# Patient Record
Sex: FEMALE | Race: WHITE | NOT HISPANIC OR LATINO | Employment: OTHER | URBAN - METROPOLITAN AREA
[De-identification: names, ages, dates, MRNs, and addresses within clinical notes are randomized per-mention and may not be internally consistent; named-entity substitution may affect disease eponyms.]

---

## 2022-10-28 ENCOUNTER — OFFICE VISIT (OUTPATIENT)
Dept: ORTHOPEDICS | Facility: CLINIC | Age: 42
End: 2022-10-28
Payer: COMMERCIAL

## 2022-10-28 ENCOUNTER — HOSPITAL ENCOUNTER (OUTPATIENT)
Dept: RADIOLOGY | Facility: HOSPITAL | Age: 42
Discharge: HOME | End: 2022-10-28
Attending: STUDENT IN AN ORGANIZED HEALTH CARE EDUCATION/TRAINING PROGRAM
Payer: COMMERCIAL

## 2022-10-28 ENCOUNTER — TELEPHONE (OUTPATIENT)
Dept: ORTHOPEDICS | Facility: CLINIC | Age: 42
End: 2022-10-28
Payer: COMMERCIAL

## 2022-10-28 DIAGNOSIS — M25.531 RIGHT WRIST PAIN: ICD-10-CM

## 2022-10-28 DIAGNOSIS — M65.4 DE QUERVAIN'S TENOSYNOVITIS, RIGHT: Primary | ICD-10-CM

## 2022-10-28 DIAGNOSIS — M25.531 RIGHT WRIST PAIN: Primary | ICD-10-CM

## 2022-10-28 PROCEDURE — 1159F MED LIST DOCD IN RCRD: CPT | Mod: CPTII,S$GLB,, | Performed by: STUDENT IN AN ORGANIZED HEALTH CARE EDUCATION/TRAINING PROGRAM

## 2022-10-28 PROCEDURE — 1159F PR MEDICATION LIST DOCUMENTED IN MEDICAL RECORD: ICD-10-PCS | Mod: CPTII,S$GLB,, | Performed by: STUDENT IN AN ORGANIZED HEALTH CARE EDUCATION/TRAINING PROGRAM

## 2022-10-28 PROCEDURE — 99999 PR PBB SHADOW E&M-EST. PATIENT-LVL III: CPT | Mod: PBBFAC,,, | Performed by: STUDENT IN AN ORGANIZED HEALTH CARE EDUCATION/TRAINING PROGRAM

## 2022-10-28 PROCEDURE — 99999 PR PBB SHADOW E&M-EST. PATIENT-LVL III: ICD-10-PCS | Mod: PBBFAC,,, | Performed by: STUDENT IN AN ORGANIZED HEALTH CARE EDUCATION/TRAINING PROGRAM

## 2022-10-28 PROCEDURE — 20550 TENDON SHEATH: ICD-10-PCS | Mod: RT,S$GLB,, | Performed by: STUDENT IN AN ORGANIZED HEALTH CARE EDUCATION/TRAINING PROGRAM

## 2022-10-28 PROCEDURE — 73110 X-RAY EXAM OF WRIST: CPT | Mod: 26,RT,, | Performed by: RADIOLOGY

## 2022-10-28 PROCEDURE — 73110 X-RAY EXAM OF WRIST: CPT | Mod: TC,FY,PO,RT

## 2022-10-28 PROCEDURE — 1160F RVW MEDS BY RX/DR IN RCRD: CPT | Mod: CPTII,S$GLB,, | Performed by: STUDENT IN AN ORGANIZED HEALTH CARE EDUCATION/TRAINING PROGRAM

## 2022-10-28 PROCEDURE — 99203 PR OFFICE/OUTPT VISIT, NEW, LEVL III, 30-44 MIN: ICD-10-PCS | Mod: 25,S$GLB,, | Performed by: STUDENT IN AN ORGANIZED HEALTH CARE EDUCATION/TRAINING PROGRAM

## 2022-10-28 PROCEDURE — 1160F PR REVIEW ALL MEDS BY PRESCRIBER/CLIN PHARMACIST DOCUMENTED: ICD-10-PCS | Mod: CPTII,S$GLB,, | Performed by: STUDENT IN AN ORGANIZED HEALTH CARE EDUCATION/TRAINING PROGRAM

## 2022-10-28 PROCEDURE — 76942 TENDON SHEATH: ICD-10-PCS | Mod: S$GLB,,, | Performed by: STUDENT IN AN ORGANIZED HEALTH CARE EDUCATION/TRAINING PROGRAM

## 2022-10-28 PROCEDURE — 99203 OFFICE O/P NEW LOW 30 MIN: CPT | Mod: 25,S$GLB,, | Performed by: STUDENT IN AN ORGANIZED HEALTH CARE EDUCATION/TRAINING PROGRAM

## 2022-10-28 PROCEDURE — 73110 XR WRIST COMPLETE 3 VIEWS RIGHT: ICD-10-PCS | Mod: 26,RT,, | Performed by: RADIOLOGY

## 2022-10-28 PROCEDURE — 20550 NJX 1 TENDON SHEATH/LIGAMENT: CPT | Mod: RT,S$GLB,, | Performed by: STUDENT IN AN ORGANIZED HEALTH CARE EDUCATION/TRAINING PROGRAM

## 2022-10-28 PROCEDURE — 4010F PR ACE/ARB THEARPY RXD/TAKEN: ICD-10-PCS | Mod: CPTII,S$GLB,, | Performed by: STUDENT IN AN ORGANIZED HEALTH CARE EDUCATION/TRAINING PROGRAM

## 2022-10-28 PROCEDURE — 4010F ACE/ARB THERAPY RXD/TAKEN: CPT | Mod: CPTII,S$GLB,, | Performed by: STUDENT IN AN ORGANIZED HEALTH CARE EDUCATION/TRAINING PROGRAM

## 2022-10-28 PROCEDURE — 76942 ECHO GUIDE FOR BIOPSY: CPT | Mod: S$GLB,,, | Performed by: STUDENT IN AN ORGANIZED HEALTH CARE EDUCATION/TRAINING PROGRAM

## 2022-10-28 RX ORDER — METFORMIN HYDROCHLORIDE 500 MG/1
TABLET, EXTENDED RELEASE ORAL
COMMUNITY
Start: 2022-10-14

## 2022-10-28 RX ORDER — PANTOPRAZOLE SODIUM 40 MG/1
TABLET, DELAYED RELEASE ORAL
COMMUNITY
Start: 2022-08-11

## 2022-10-28 RX ORDER — MELOXICAM 15 MG
TABLET ORAL
COMMUNITY
Start: 2022-05-19 | End: 2023-05-15

## 2022-10-28 RX ORDER — CYCLOBENZAPRINE HCL 10 MG
10 TABLET ORAL 3 TIMES DAILY PRN
COMMUNITY
Start: 2022-10-22 | End: 2023-05-15

## 2022-10-28 RX ORDER — PREDNISONE 20 MG/1
TABLET ORAL
COMMUNITY
Start: 2022-10-22 | End: 2022-12-07

## 2022-10-28 RX ORDER — BETAMETHASONE SODIUM PHOSPHATE AND BETAMETHASONE ACETATE 3; 3 MG/ML; MG/ML
6 INJECTION, SUSPENSION INTRA-ARTICULAR; INTRALESIONAL; INTRAMUSCULAR; SOFT TISSUE
Status: DISCONTINUED | OUTPATIENT
Start: 2022-10-28 | End: 2022-10-28 | Stop reason: HOSPADM

## 2022-10-28 RX ADMIN — BETAMETHASONE SODIUM PHOSPHATE AND BETAMETHASONE ACETATE 6 MG: 3; 3 INJECTION, SUSPENSION INTRA-ARTICULAR; INTRALESIONAL; INTRAMUSCULAR; SOFT TISSUE at 11:10

## 2022-10-28 NOTE — PATIENT INSTRUCTIONS
Assessment:  Maria Guadalupe Louise is a 42 y.o. female with a chief complaint of Pain of the Right Wrist      Encounter Diagnosis   Name Primary?    De Quervain's tenosynovitis, right Yes        Plan:  XR reviewed today, nondiagnostic  History & clinical exam c/w dequervain's tenosynovitis  Has not responded with NSAIDs & bracing thus far  Recommend CSI today  Continue brace for the next week, then wean as able  Can consider repeat CSI if persistent or recurs    Follow-up: 6 weeks or sooner if there are any problems between now and then.    Thank you for choosing [facility] and Dr. Saúl Jarquin for your orthopedic & sports medicine care. It is our goal to provide you with exceptional care that will help keep you healthy, active, and get you back in the game.    Please do not hesitate to reach out to us via email, phone, or MyChart with any questions, concerns, or feedback.    If you are experiencing pain/discomfort ,or have questions after 5pm and would like to be connected to the [facility] on-call team, please call this number and specify which Sports Medicine provider is treating you: [phone]

## 2022-10-28 NOTE — TELEPHONE ENCOUNTER
Spoke with pt to inform her to arrive 30 min prior to appt for xrays. Pt explained she is having palm pain with some in wrist. Pt suspects CTS. Pt verbalized understanding of appt date, time, and location.

## 2022-10-28 NOTE — PROGRESS NOTES
Patient ID: Maria Guadalupe Louise  YOB: 1980  MRN: [id number]    Chief Complaint: Pain of the Right Wrist    Referred By: Self    History of Present Illness: Maria Guadalupe Louise is a right-hand dominant 42 y.o. female who presents today with right wrist pain.     She complains of right wrist pain that has worsened in severity over the past few weeks without injury.  Pain is located in the radial side of the wrist.  Worsened with any type of movement.  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  She has tried using OTC NSAIDs and a wrist brace without relief.  No injections or physical therapy.  No parasthesias.    Past Medical History:   History reviewed. No pertinent past medical history.  Past Surgical History:   Procedure Laterality Date    ABDOMINOPLASTY      AUGMENTATION OF BREAST Bilateral 2011    DILATION AND CURETTAGE OF UTERUS      excision of vulvar mass      HYSTERECTOMY      HYSTEROSCOPY      LIPOSUCTION      MASTOPEXY Bilateral     novasure      oral sx      TUBAL LIGATION      TYMPANOSTOMY TUBE PLACEMENT      WISDOM TOOTH EXTRACTION       Family History   Problem Relation Age of Onset    Ovarian cancer Maternal Grandfather     Breast cancer Maternal Grandfather      Social History     Socioeconomic History    Marital status:    Tobacco Use    Smoking status: Never    Smokeless tobacco: Never   Substance and Sexual Activity    Alcohol use: Not Currently    Drug use: Never     Medication List with Changes/Refills   Current Medications    ATENOLOL (TENORMIN) 25 MG TABLET        CYCLOBENZAPRINE (FLEXERIL) 10 MG TABLET    Take 10 mg by mouth 3 (three) times daily as needed.    LISINOPRIL (PRINIVIL,ZESTRIL) 20 MG TABLET        METFORMIN (GLUCOPHAGE-XR) 500 MG ER 24HR TABLET        MOBIC 15 MG TABLET    Take 1 tablet by mouth once a day as needed with food    PANTOPRAZOLE (PROTONIX) 40 MG TABLET        PREDNISONE (DELTASONE) 20 MG TABLET    prednisone Take  2 Tablet (oral) 1 time per day for 7 days 20221022 tablet 1 time per day oral 7 days active 20 mg     Review of patient's allergies indicates:   Allergen Reactions    Penicillins Anaphylaxis and Swelling    Hydromorphone     Macrolide antibiotics Nausea And Vomiting       Physical Exam:   There is no height or weight on file to calculate BMI.    Physical Exam  Constitutional:       General: She is not in acute distress.     Appearance: Normal appearance.   HENT:      Head: Normocephalic and atraumatic.   Eyes:      Extraocular Movements: Extraocular movements intact.      Conjunctiva/sclera: Conjunctivae normal.   Pulmonary:      Effort: Pulmonary effort is normal. No respiratory distress.   Skin:     General: Skin is warm and dry.   Neurological:      General: No focal deficit present.      Mental Status: She is alert and oriented to person, place, and time.   Psychiatric:         Mood and Affect: Mood normal.         Detailed MSK exam:   Right Wrist:  Inspection:    No effusion, erythema, or ecchymosis   Palpation Tenderness: Severe tenderness Radial styloid, 1st dorsal compartment  Range of motion:   Full hand/wrist ROM equal bilaterally   Strength:    Decreased  strength  Tests:   Negative Tinel's at wrist     Positive Finklestein's test  N/V Exam:  Radial: Normal motor (EPL/thumbs up)              Normal sensory (dorsal hand)   Median: Normal motor (FPL/A-OK)      Normal sensory (thumb)   Ulnar:  Normal motor (Interossei/scissors-spread)     Normal sensory (5th finger)   LABC: Normal sensory (lateral forearm)   MABC: Normal sensory (medial forearm)   MC: Normal motor (elbow flexion)   Axillary: Normal motor/sensory (deltoid)  Normal radial and ulnar pulses, warm and well perfused with capillary refill < 2 sec         Imaging:  X-Ray Wrist Complete Right  Narrative: EXAMINATION:  Five images right wrist    CLINICAL HISTORY:  Pain    COMPARISON:  None    FINDINGS:  Radiocarpal joint space narrowing is  present.  No fracture, dislocation or aggressive osseous process.  Soft tissues are normal.  Impression: Mild degenerative changes    Electronically signed by: Frantz Gutierrez MD  Date:    10/28/2022  Time:    11:09      Relevant imaging results were reviewed and interpreted by me and per my read: Normal appearing radiographs of the right wrist, without any significant degenerative changes, and no fractures or other acute abnormalities.    This was discussed with the patient and / or family today.       Patient Instructions   Assessment:  Maria Guadalupe Louise is a 42 y.o. female with a chief complaint of Pain of the Right Wrist      Encounter Diagnosis   Name Primary?    De Quervain's tenosynovitis, right Yes        Plan:  XR reviewed today, nondiagnostic  History & clinical exam c/w dequervain's tenosynovitis  Has not responded with NSAIDs & bracing thus far  Recommend CSI today  Continue brace for the next week, then wean as able  Can consider repeat CSI if persistent or recurs    Follow-up: 6 weeks or sooner if there are any problems between now and then.    Thank you for choosing [facility] and Dr. Saúl Jarquin for your orthopedic & sports medicine care. It is our goal to provide you with exceptional care that will help keep you healthy, active, and get you back in the game.    Please do not hesitate to reach out to us via email, phone, or MyChart with any questions, concerns, or feedback.    If you are experiencing pain/discomfort ,or have questions after 5pm and would like to be connected to the [facility] on-call team, please call this number and specify which Sports Medicine provider is treating you: [phone]         A copy of today's visit note has been sent to the referring provider.       Saúl Jarquin MD  Primary Care Sports Medicine        Disclaimer: This note was prepared using a voice recognition system and is likely to have sound alike  errors within the text.

## 2022-10-28 NOTE — PROCEDURES
Tendon Sheath    Date/Time: 10/28/2022 11:30 AM  Performed by: Saúl Jarquin MD  Authorized by: Saúl Jarquin MD     Consent Done?:  Yes (Verbal)  Indications:  Pain, joint swelling and diagnostic evaluation  Site marked: the procedure site was marked    Timeout: prior to procedure the correct patient, procedure, and site was verified    Local anesthesia used?: Yes    Anesthesia:  Local infiltration  Local anesthetic:  Lidocaine 1% without epinephrine and topical anesthetic  Anesthetic total (ml):  1    Location:  Wrist  Site:  R first doral compartment  Ultrasonic guidance for needle placement?: Yes    Needle size:  25 G  Approach:  Medial  Medications:  6 mg betamethasone acetate-betamethasone sodium phosphate 6 mg/mL  Patient tolerance:  Patient tolerated the procedure well with no immediate complications    Additional Comments: Ultrasound guidance was used for needle localization. Images were saved and stored for documentation. The appropriate structures were visualized. Dynamic visualization of the needle was continuous throughout the procedures and maintained good position.     We discussed the proper protocols after the injection such as no submerging pools, baths tubs, or hot tubs for 48 hr.  Showering is okay today.  We also discussed that blood sugars can be elevated after an injection and asked patient to properly check their sugars over the next few days and contact their PCP if there are any concerns.  We discussed red flags such as fevers, chills, red, warm, tender joint at the area of injection to please seek medical care immediately.

## 2022-11-01 ENCOUNTER — TELEPHONE (OUTPATIENT)
Dept: ORTHOPEDICS | Facility: CLINIC | Age: 42
End: 2022-11-01
Payer: COMMERCIAL

## 2022-11-01 NOTE — TELEPHONE ENCOUNTER
Spoke with pt to discuss next steps for wrist pain. Pt states no relief from injection Friday. Explained it could take up to one week to feel relief from injection. Per Dr. Jarquin, offered her appt for next week and ibuprofen 800 as an inflammatory. Pt was disappointed in options currently. Offered her appt next week. Explained she could discuss options with Dr. Jarquin including MRI, second injection. Pt stated she would schedule on her own.    ----- Message from Marlena Koenig sent at 11/1/2022  8:37 AM CDT -----  States she got a shot in her wrist on Friday. States it hasn't helped at all. Please call pt [phone]. Thank you

## 2022-11-09 ENCOUNTER — TELEPHONE (OUTPATIENT)
Dept: ORTHOPEDICS | Facility: CLINIC | Age: 42
End: 2022-11-09
Payer: COMMERCIAL

## 2022-11-09 NOTE — TELEPHONE ENCOUNTER
Called regarding upcoming appt. 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 Explained that it is best to continue care w/ the provider she is established w/. Pt insisted on being seen within two days due to no relief w/ CSI. Scheduled soonest available w/ upper extremity provider. Verified appt time, date, and location w/ the pt.

## 2022-11-22 ENCOUNTER — HOSPITAL ENCOUNTER (OUTPATIENT)
Dept: RADIOLOGY | Facility: HOSPITAL | Age: 42
Discharge: HOME | End: 2022-11-22
Attending: INTERNAL MEDICINE
Payer: COMMERCIAL

## 2022-11-22 ENCOUNTER — OFFICE VISIT (OUTPATIENT)
Dept: RHEUMATOLOGY | Facility: CLINIC | Age: 42
End: 2022-11-22
Payer: COMMERCIAL

## 2022-11-22 VITALS
BODY MASS INDEX: 25.79 KG/M2 | WEIGHT: 160.5 LBS | HEART RATE: 89 BPM | DIASTOLIC BLOOD PRESSURE: 83 MMHG | HEIGHT: 66 IN | SYSTOLIC BLOOD PRESSURE: 134 MMHG

## 2022-11-22 DIAGNOSIS — M15.9 PRIMARY OSTEOARTHRITIS INVOLVING MULTIPLE JOINTS: ICD-10-CM

## 2022-11-22 DIAGNOSIS — M19.90 INFLAMMATORY ARTHRITIS: ICD-10-CM

## 2022-11-22 DIAGNOSIS — M79.641 PAIN IN BOTH HANDS: ICD-10-CM

## 2022-11-22 DIAGNOSIS — M79.642 PAIN IN BOTH HANDS: ICD-10-CM

## 2022-11-22 DIAGNOSIS — Q76.49 BERTOLOTTI'S SYNDROME: ICD-10-CM

## 2022-11-22 DIAGNOSIS — M13.80 SERONEGATIVE ARTHRITIS: Primary | ICD-10-CM

## 2022-11-22 PROCEDURE — 3079F DIAST BP 80-89 MM HG: CPT | Mod: CPTII,S$GLB,, | Performed by: INTERNAL MEDICINE

## 2022-11-22 PROCEDURE — 3075F PR MOST RECENT SYSTOLIC BLOOD PRESS GE 130-139MM HG: ICD-10-PCS | Mod: CPTII,S$GLB,, | Performed by: INTERNAL MEDICINE

## 2022-11-22 PROCEDURE — 1159F MED LIST DOCD IN RCRD: CPT | Mod: CPTII,S$GLB,, | Performed by: INTERNAL MEDICINE

## 2022-11-22 PROCEDURE — 1159F PR MEDICATION LIST DOCUMENTED IN MEDICAL RECORD: ICD-10-PCS | Mod: CPTII,S$GLB,, | Performed by: INTERNAL MEDICINE

## 2022-11-22 PROCEDURE — 4010F PR ACE/ARB THEARPY RXD/TAKEN: ICD-10-PCS | Mod: CPTII,S$GLB,, | Performed by: INTERNAL MEDICINE

## 2022-11-22 PROCEDURE — 77077 JOINT SURVEY SINGLE VIEW: CPT | Mod: TC,PO

## 2022-11-22 PROCEDURE — 3075F SYST BP GE 130 - 139MM HG: CPT | Mod: CPTII,S$GLB,, | Performed by: INTERNAL MEDICINE

## 2022-11-22 PROCEDURE — 99204 PR OFFICE/OUTPT VISIT, NEW, LEVL IV, 45-59 MIN: ICD-10-PCS | Mod: S$GLB,,, | Performed by: INTERNAL MEDICINE

## 2022-11-22 PROCEDURE — 3008F BODY MASS INDEX DOCD: CPT | Mod: CPTII,S$GLB,, | Performed by: INTERNAL MEDICINE

## 2022-11-22 PROCEDURE — 77077 JOINT SURVEY SINGLE VIEW: CPT | Mod: 26,,, | Performed by: RADIOLOGY

## 2022-11-22 PROCEDURE — 3079F PR MOST RECENT DIASTOLIC BLOOD PRESSURE 80-89 MM HG: ICD-10-PCS | Mod: CPTII,S$GLB,, | Performed by: INTERNAL MEDICINE

## 2022-11-22 PROCEDURE — 99999 PR PBB SHADOW E&M-EST. PATIENT-LVL IV: ICD-10-PCS | Mod: PBBFAC,,, | Performed by: INTERNAL MEDICINE

## 2022-11-22 PROCEDURE — 99999 PR PBB SHADOW E&M-EST. PATIENT-LVL IV: CPT | Mod: PBBFAC,,, | Performed by: INTERNAL MEDICINE

## 2022-11-22 PROCEDURE — 3008F PR BODY MASS INDEX (BMI) DOCUMENTED: ICD-10-PCS | Mod: CPTII,S$GLB,, | Performed by: INTERNAL MEDICINE

## 2022-11-22 PROCEDURE — 77077 XR ARTHRITIS SURVEY: ICD-10-PCS | Mod: 26,,, | Performed by: RADIOLOGY

## 2022-11-22 PROCEDURE — 4010F ACE/ARB THERAPY RXD/TAKEN: CPT | Mod: CPTII,S$GLB,, | Performed by: INTERNAL MEDICINE

## 2022-11-22 PROCEDURE — 99204 OFFICE O/P NEW MOD 45 MIN: CPT | Mod: S$GLB,,, | Performed by: INTERNAL MEDICINE

## 2022-11-22 RX ORDER — TOPIRAMATE 50 MG/1
50 TABLET, FILM COATED ORAL DAILY
COMMUNITY
Start: 2022-10-28

## 2022-11-22 RX ORDER — COLCHICINE 0.6 MG/1
TABLET ORAL
COMMUNITY
Start: 2022-11-17 | End: 2023-03-28

## 2022-11-22 RX ORDER — GABAPENTIN 300 MG/1
300 CAPSULE ORAL DAILY
COMMUNITY
Start: 2022-11-09 | End: 2023-05-15

## 2022-11-22 RX ORDER — ALLOPURINOL 100 MG/1
100 TABLET ORAL DAILY
COMMUNITY
Start: 2022-11-10 | End: 2023-03-28

## 2022-11-22 RX ORDER — IBUPROFEN 800 MG/1
TABLET ORAL
COMMUNITY
End: 2023-05-15

## 2022-11-22 RX ORDER — METHYLPREDNISOLONE 4 MG/1
TABLET ORAL
Qty: 21 EACH | Refills: 0 | Status: SHIPPED | OUTPATIENT
Start: 2022-11-22 | End: 2022-12-07

## 2022-11-22 RX ORDER — CELECOXIB 100 MG/1
100 CAPSULE ORAL 2 TIMES DAILY
COMMUNITY
Start: 2022-11-09

## 2022-11-22 NOTE — PROGRESS NOTES
RHEUMATOLOGY OUTPATIENT CLINIC NOTE    11/22/2022    Attending Rheumatologist: Braxton Contreras  Primary Care Provider/Physician Requesting Consultation: Primary Doctor No   Chief Complaint/Reason For Consultation:  Gout      Subjective:     Maria Guadalupe Louise is a 42 y.o. White female with inflammatory arthritis for evaluation.    Main concern of recurrent joint pain with inflammatory characteristics.    Addendum: MRI 11/29 reviewed. Ulnar side tear of TFCC with associated effusion.  Widespread extensor tendon tenosynovitis. Tophi, erosive changes, or bone marrow edema not present.  Cannot completely exclude seronegative arthritis as DDX.  Suggest to continue DMARD trial.    Addendum 1/2023: refractory pain, no efficacy from methotrexate as DMARD therapy.  Suggest trial of TNFi for at least 3 months once Quant TB resulted negative.  Written literature on biologic provided.    Review of Systems   Constitutional:  Negative for fever.   HENT:          Denies sicca sx   Eyes:  Negative for photophobia and pain.        Denies uveitis   Respiratory:  Negative for shortness of breath.    Gastrointestinal:         No hx of IBD. Hx of stress ulcers.  No history of diverticulosis   Genitourinary:  Negative for hematuria.        History of nephrolithiasis   Musculoskeletal:  Positive for joint pain.        No FHx of rheumatic disease   Skin:  Negative for rash.        No history of PsO. Denies RP   Endo/Heme/Allergies:         No history of vascular thrombosis     Chronic comorbid conditions affecting medical decision making today:  No past medical history on file.  Past Surgical History:   Procedure Laterality Date    ABDOMINOPLASTY      AUGMENTATION OF BREAST Bilateral 2011    DILATION AND CURETTAGE OF UTERUS      excision of vulvar mass      HYSTERECTOMY      HYSTEROSCOPY      LIPOSUCTION      MASTOPEXY Bilateral     novasure      oral sx      TUBAL LIGATION      TYMPANOSTOMY TUBE PLACEMENT      WISDOM TOOTH EXTRACTION        Family History   Problem Relation Age of Onset    Ovarian cancer Maternal Grandfather     Breast cancer Maternal Grandfather      Social History     Tobacco Use   Smoking Status Never   Smokeless Tobacco Never       Current Outpatient Medications:     allopurinoL (ZYLOPRIM) 100 MG tablet, Take 100 mg by mouth once daily., Disp: , Rfl:     atenoloL (TENORMIN) 25 MG tablet, , Disp: , Rfl:     celecoxib (CELEBREX) 100 MG capsule, Take 100 mg by mouth 2 (two) times daily., Disp: , Rfl:     colchicine (COLCRYS) 0.6 mg tablet, Take by mouth., Disp: , Rfl:     cyclobenzaprine (FLEXERIL) 10 MG tablet, Take 10 mg by mouth 3 (three) times daily as needed., Disp: , Rfl:     gabapentin (NEURONTIN) 300 MG capsule, Take 300 mg by mouth once daily., Disp: , Rfl:     ibuprofen (ADVIL,MOTRIN) 800 MG tablet, ibuprofen 800 mg tablet  Take 1 tablet 3 times a day by oral route., Disp: , Rfl:     lisinopriL (PRINIVIL,ZESTRIL) 20 MG tablet, , Disp: , Rfl:     metFORMIN (GLUCOPHAGE-XR) 500 MG ER 24hr tablet, , Disp: , Rfl:     MOBIC 15 mg tablet, Take 1 tablet by mouth once a day as needed with food, Disp: , Rfl:     pantoprazole (PROTONIX) 40 MG tablet, , Disp: , Rfl:     predniSONE (DELTASONE) 20 MG tablet, prednisone Take 2 Tablet (oral) 1 time per day for 7 days 20221022 tablet 1 time per day oral 7 days active 20 mg, Disp: , Rfl:     topiramate (TOPAMAX) 50 MG tablet, Take 50 mg by mouth once daily., Disp: , Rfl:      Objective:     Vitals:    11/22/22 1034   BP: 134/83   Pulse: 89     Physical Exam   Eyes: Conjunctivae are normal.   Pulmonary/Chest: Effort normal. No respiratory distress.   Musculoskeletal:         General: Swelling and tenderness present. Normal range of motion.   Neurological: She displays no weakness.   Skin: No rash noted.     Reviewed available old and all outside pertinent medical records available.    All lab results personally reviewed and interpreted by me.       ASSESSMENT:     Inflammatory  arthritis    Osteoarthritis    Degenerative disk disease    Bertolotti syndrome    PLAN:     Subacute widespread inflammatory arthritis of unknown etiology.  Presumptive diagnosis of gout by PMD based on podagra.  No past history of gout or arthrocentesis.  Denies classic gout precipitants. History of nephrolithiasis. No history of PsO or IBD.  No pertinent FHx.  Active synovitis on several joints.  Suspicious rashes not identified.  Uric acid 4.7, measured during acute event.  MRI C/L spine with degenerative changes and sacralization of L5.  No ankylosis or marginal erosive changes.  Recommend systemic steroid taper to discontinue for symptomatic relief.  On allopurinol and colchicine per other providers, continue unchanged for time being.  Rheumatic workup to determine etiology.  Considering DMARD therapy with MTX after lab review if no contraindications. XR survey to establish baseline/determine if erosive changes.      Disclaimer: This note is prepared using voice recognition software and as such is likely to have errors and has not been proof read. Please contact me for questions.       Braxton Contreras M.D.

## 2022-11-23 DIAGNOSIS — M13.80 SERONEGATIVE ARTHRITIS: Primary | ICD-10-CM

## 2022-11-23 RX ORDER — METHOTREXATE 2.5 MG/1
15 TABLET ORAL
Qty: 24 TABLET | Refills: 4 | Status: SHIPPED | OUTPATIENT
Start: 2022-11-23 | End: 2023-02-15 | Stop reason: SDUPTHER

## 2022-11-23 RX ORDER — FOLIC ACID 1 MG/1
1 TABLET ORAL DAILY
Qty: 30 TABLET | Refills: 4 | Status: SHIPPED | OUTPATIENT
Start: 2022-11-23 | End: 2023-02-15 | Stop reason: SDUPTHER

## 2022-11-23 RX ORDER — HYDROXYCHLOROQUINE SULFATE 200 MG/1
200 TABLET, FILM COATED ORAL 2 TIMES DAILY
Qty: 60 TABLET | Refills: 3 | Status: SHIPPED | OUTPATIENT
Start: 2022-11-23 | End: 2022-12-23

## 2022-11-28 ENCOUNTER — TELEPHONE (OUTPATIENT)
Dept: RHEUMATOLOGY | Facility: CLINIC | Age: 42
End: 2022-11-28
Payer: COMMERCIAL

## 2022-11-28 DIAGNOSIS — M13.80 SERONEGATIVE ARTHRITIS: Primary | ICD-10-CM

## 2022-11-28 NOTE — TELEPHONE ENCOUNTER
Spoke with lab and the  arrived too late and the TB gold was not able to get in the incubator in time. Will need to be redrawn.     Please place new orders,

## 2022-12-02 ENCOUNTER — TELEPHONE (OUTPATIENT)
Dept: ORTHOPEDICS | Facility: CLINIC | Age: 42
End: 2022-12-02
Payer: COMMERCIAL

## 2022-12-02 NOTE — TELEPHONE ENCOUNTER
Patient reported to clinic today to drop off an MRI of her wrist for Dr. Jarquin to review.  I provided my email address for her to send the report and asked her to obtain a disc so that we can evaluate the images.  She also requested a sling.  I explained that we do not typically use slings for the treatment of wrist pain and that she risks elbow stiffness if she immobilizes her entire upper extremity.  Upon further questioning, she reported that she is not using her wrist brace because it doesn't help.  I expressed that it is doubtful that a sling would be helpful if the wrist brace is not helping and recommended that we discuss further after we review her MRI at her visit next Friday.  The patient agreed with these instructions.

## 2022-12-03 ENCOUNTER — PATIENT MESSAGE (OUTPATIENT)
Dept: RHEUMATOLOGY | Facility: CLINIC | Age: 42
End: 2022-12-03
Payer: COMMERCIAL

## 2022-12-03 ENCOUNTER — PATIENT MESSAGE (OUTPATIENT)
Dept: ORTHOPEDICS | Facility: CLINIC | Age: 42
End: 2022-12-03
Payer: COMMERCIAL

## 2022-12-07 ENCOUNTER — PATIENT MESSAGE (OUTPATIENT)
Dept: RHEUMATOLOGY | Facility: CLINIC | Age: 42
End: 2022-12-07
Payer: COMMERCIAL

## 2022-12-07 RX ORDER — PREDNISONE 5 MG/1
TABLET ORAL
Qty: 90 TABLET | Refills: 1 | Status: SHIPPED | OUTPATIENT
Start: 2022-12-07 | End: 2023-03-01 | Stop reason: SDUPTHER

## 2022-12-07 RX ORDER — MULTIVITAMIN
1 TABLET ORAL 2 TIMES DAILY
Qty: 60 TABLET | Refills: 3 | Status: SHIPPED | OUTPATIENT
Start: 2022-12-07 | End: 2023-03-01 | Stop reason: SDUPTHER

## 2022-12-07 NOTE — TELEPHONE ENCOUNTER
Spoke with pt and advised her of below, explained that the MRI report shows a tear and that she would need to see Orthopedics. Pt states that Dr. Jarquin has still not even read the MRI report she dropped off and she feels like no one cares about what is going on with her and she is in excruciating pain. States she has to get her neurologist to order MRI. Advised her that she could try prednisone to allow time for the MTX to work but she may still have pain from the tear. States that Ortho told her they do not have any openings right now and she isnt sure when she could even see them and she needs something done now. Advised her that I am the supervisor for Rheum and I would reach out to the Supervisor for Ortho to see if they can assist with getting the MRI reviewed sooner than later and an appt. Pt states that she has a lot of things coming up this month and if she has to have surgery she doesn't even know when she would do it- advised her that I am not sure if it would call for surgery but she can at least talk and see what the next steps area. Stated she would try the prednisone, advised her that it will likely not give her full relief but hopefully at least a little. Pt was thankful for the call.

## 2023-01-03 ENCOUNTER — PATIENT MESSAGE (OUTPATIENT)
Dept: RHEUMATOLOGY | Facility: CLINIC | Age: 43
End: 2023-01-03
Payer: COMMERCIAL

## 2023-01-04 ENCOUNTER — SPECIALTY PHARMACY (OUTPATIENT)
Dept: PHARMACY | Facility: CLINIC | Age: 43
End: 2023-01-04
Payer: COMMERCIAL

## 2023-01-04 DIAGNOSIS — M13.80 SERONEGATIVE ARTHRITIS: Primary | ICD-10-CM

## 2023-01-04 RX ORDER — ADALIMUMAB 40MG/0.4ML
40 KIT SUBCUTANEOUS
Qty: 6 PEN | Refills: 1 | Status: SHIPPED | OUTPATIENT
Start: 2023-01-04 | End: 2023-04-25

## 2023-01-04 NOTE — TELEPHONE ENCOUNTER
Dinorah, this is Mehrdad Garber with [facility].  We are working on your prescription that your doctor has sent us. We will be working with your insurance to get this approved for you. We will be calling you along the way with updates on your medication.  If you have any questions, you can reach us at [phone].    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   -Aware that Tb test needs to be collected prior to submitting PA.

## 2023-01-05 ENCOUNTER — TELEPHONE (OUTPATIENT)
Dept: RHEUMATOLOGY | Facility: CLINIC | Age: 43
End: 2023-01-05
Payer: COMMERCIAL

## 2023-01-05 NOTE — TELEPHONE ENCOUNTER
----- Message from Jeanette Mello sent at 1/5/2023  9:17 AM CST -----  Contact: Maria Guadalupe  Patient is calling to speak with the nurse regarding treatment. Please give patient a call back at [phone]  as requested.  Thanks  LR

## 2023-01-05 NOTE — TELEPHONE ENCOUNTER
"Returned patients phone call to discuss the concerns that she has about her treatment with her. All questions answered.      Summer Godwin (Allye), [facility]  Rheumatology Department    "

## 2023-01-09 ENCOUNTER — TELEPHONE (OUTPATIENT)
Dept: RHEUMATOLOGY | Facility: CLINIC | Age: 43
End: 2023-01-09
Payer: COMMERCIAL

## 2023-01-09 NOTE — TELEPHONE ENCOUNTER
Per chart review patient missed lab appointment and labs have been rescheduled for tomorrow, 01/10/23.

## 2023-01-09 NOTE — TELEPHONE ENCOUNTER
"Returned patients phone call. All questions answered.      Summer Godwin (Allye), [facility]  Rheumatology Department    " [facility] Adderall [facility] 11[facility]10/20 for a 30 d/s. Last Visit: 10/25/19 with AHMET Velez  Next Appointment: 2/11/21 with AHMET Fagan  Previous Refill Encounter(s): 11/10/20 #90    Requested Prescriptions     Pending Prescriptions Disp Refills    dextroamphetamine-amphetamine (ADDERALL) 20 mg tablet 90 Tab 0     Sig: Take 1 Tab by mouth three (3) times daily. Max Daily Amount: 60 mg.

## 2023-01-09 NOTE — TELEPHONE ENCOUNTER
----- Message from Sandy Duffy sent at 1/9/2023  2:19 PM CST -----  Regarding: labs  Contact: patient  Patient needs to reschedule her missed labs and TB, but it would not let me reschedule, patient would like to have both labs done at [facility], please call her back at [phone]

## 2023-01-10 ENCOUNTER — LAB VISIT (OUTPATIENT)
Dept: LAB | Facility: HOSPITAL | Age: 43
End: 2023-01-10
Attending: INTERNAL MEDICINE
Payer: COMMERCIAL

## 2023-01-10 DIAGNOSIS — M19.90 INFLAMMATORY ARTHRITIS: ICD-10-CM

## 2023-01-10 DIAGNOSIS — M13.80 SERONEGATIVE ARTHRITIS: ICD-10-CM

## 2023-01-10 PROCEDURE — 86480 TB TEST CELL IMMUN MEASURE: CPT | Performed by: INTERNAL MEDICINE

## 2023-01-12 LAB
GAMMA INTERFERON BACKGROUND BLD IA-ACNC: 0.01 IU/ML
M TB IFN-G CD4+ BCKGRND COR BLD-ACNC: 0.01 IU/ML
MITOGEN IGNF BCKGRD COR BLD-ACNC: 1.15 IU/ML
TB GOLD PLUS: NEGATIVE
TB2 - NIL: -0 IU/ML

## 2023-01-13 NOTE — TELEPHONE ENCOUNTER
Humira PA for seronegative arthritis completed over the phone with DANICA MISHRA approved 1/13/2023 through 1/13/2024  Case ID: [id number]

## 2023-01-13 NOTE — TELEPHONE ENCOUNTER
Benefits Investigation -Humira    Insurance name: [employer]  Rep name: [facility]    Copay amount: $1,684.00 ($542.89 with e-voucher) ($5 with e-voucher + co-pay card)  Deductible: $100 individual ($64.38 remaining) $300 family ($264.38 remaining)  OOPmax: $1900 ($1864.38 remaining)  [facility] in network? Y

## 2023-01-13 NOTE — TELEPHONE ENCOUNTER
Outgoing call to inform patient of Humira approval and offer to obtain co-pay card on her behalf. Left voicemail.

## 2023-01-20 NOTE — TELEPHONE ENCOUNTER
Humira co-pay card obtained and processing information saved into [facility].  RxBIN: [id number]  RxPCN: [id number]  RxGrp: [id number]  RxID: [id number]  Suf: 01    Test claim yielding $5.

## 2023-01-23 ENCOUNTER — SPECIALTY PHARMACY (OUTPATIENT)
Dept: PHARMACY | Facility: CLINIC | Age: 43
End: 2023-01-23
Payer: COMMERCIAL

## 2023-01-23 DIAGNOSIS — M13.80 SERONEGATIVE ARTHRITIS: Primary | ICD-10-CM

## 2023-01-23 NOTE — TELEPHONE ENCOUNTER
Specialty Pharmacy - Initial Clinical Assessment    Specialty Medication Orders Linked to Encounter      Flowsheet Row Most Recent Value   Medication #1 adalimumab (HUMIRA,CF, PEN) 40 mg/0.4 mL PnKt (Order#[id number], Rx#[id number])          Patient Diagnosis   M13.80 - Seronegative arthritis    Subjective    Maria Guadalupe Louise is a 42 y.o. female, who is followed by the specialty pharmacy service for management and education.    Recent Encounters       Date Type Provider Description    01/23/2023 Specialty Pharmacy Mehrdad Garber PharmD Initial Clinical Assessment    01/04/2023 Specialty Pharmacy Mehrdad Garber PharmD Referral Authorization          Clinical call attempts since last clinical assessment   No call attempts found.     Current Outpatient Medications   Medication Sig Note    adalimumab (HUMIRA,CF, PEN) 40 mg/0.4 mL PnKt Inject 0.4 mLs (40 mg total) into the skin every 14 (fourteen) days.     allopurinoL (ZYLOPRIM) 100 MG tablet Take 100 mg by mouth once daily. 1/23/2023: Patient reports not taking.    atenoloL (TENORMIN) 25 MG tablet      calcium-vitamin D (CALCIUM 600 + D,3,) 600 mg-10 mcg (400 unit) Tab Take 1 tablet by mouth 2 (two) times daily.     celecoxib (CELEBREX) 100 MG capsule Take 100 mg by mouth 2 (two) times daily.     colchicine (COLCRYS) 0.6 mg tablet Take by mouth. 1/23/2023: Patient reports not taking.    cyclobenzaprine (FLEXERIL) 10 MG tablet Take 10 mg by mouth 3 (three) times daily as needed.     folic acid (FOLVITE) 1 MG tablet Take 1 tablet (1 mg total) by mouth once daily.     gabapentin (NEURONTIN) 300 MG capsule Take 300 mg by mouth once daily. 1/23/2023: Patient reports currently not taking.    ibuprofen (ADVIL,MOTRIN) 800 MG tablet ibuprofen 800 mg tablet   Take 1 tablet 3 times a day by oral route.     lisinopriL (PRINIVIL,ZESTRIL) 20 MG tablet      metFORMIN (GLUCOPHAGE-XR) 500 MG ER 24hr tablet      methotrexate 2.5 MG Tab Take 6 tablets (15 mg total) by mouth every 7  days.     MOBIC 15 mg tablet Take 1 tablet by mouth once a day as needed with food 1/23/2023: Patient reports currently not taking.    pantoprazole (PROTONIX) 40 MG tablet      predniSONE (DELTASONE) 5 MG tablet May take 5-15 mg of prednisone daily PRN for RA related pain. 1/23/2023: Patient reports currently not taking.    topiramate (TOPAMAX) 50 MG tablet Take 50 mg by mouth once daily.    Last reviewed on 1/23/2023  2:45 PM by Mehrdad Garber, PharmD    Review of patient's allergies indicates:   Allergen Reactions    Penicillins Anaphylaxis and Swelling    Hydromorphone     Macrolide antibiotics Nausea And Vomiting     Patient states she can take Azithromycin    Last reviewed on  1/23/2023 2:45 PM by Mehrdad Garber    Drug Interactions    Drug interactions evaluated: yes  Clinically relevant drug interactions identified: yes   Interactions list: Celebrex-Mobic-ibuprofen : increased risk of GI side effects and bleeding      Drug management plan: Do not take multiple NSAIDs concurrently.    Provided the patient with educational material regarding drug interactions: yes         Adverse Effects    Arthralgias: Pos  Gait problem: Pos  Joint swelling: Pos  Myalgias: Pos  Neck pain: Pos  Neck stiffness: Pos  *All other systems reviewed and are negative       Assessment Questions - Documented Responses      Flowsheet Row Most Recent Value   Assessment    Medication Reconciliation completed for patient Yes   During the past 4 weeks, has patient missed any activities due to condition or medication? Missed Work, Missed Planned Activities   Number of Days missed 9   During the past 4 weeks, did patient have any of the following urgent care visits? None   Goals of Therapy Status Discussed (new start)   All education points have been covered with patient? Yes, supplemental printed education provided   Welcome packet contents reviewed and discussed with patient? Yes   Plan Therapy being initiated   Do you need to open a  "clinical intervention (i-vent)? No   Do you want to schedule first shipment? Yes          Refill Questions - Documented Responses      Flowsheet Row Most Recent Value   Patient Availability and HIPAA Verification    Does patient want to proceed with activity? Yes   HIPAA/medical authority confirmed? Yes   Relationship to patient of person spoken to? Self   Refill Screening Questions    When does the patient need to receive the medication? 01/26/23   Refill Delivery Questions    How will the patient receive the medication? MEDRx   When does the patient need to receive the medication? 01/26/23   Shipping Address Temporary   Address in [facility] confirmed and updated if neccessary? Yes   Expected Copay ($) 5   Is the patient able to afford the medication copay? Yes   Payment Method new CC added to file   Days supply of Refill 28   Supplies needed? Alcohol Swabs   Refill activity completed? Yes   Refill activity plan Refill scheduled   Shipment/Pickup Date: 01/25/23            Objective    She has no past medical history on file.    Tried/failed medications: NSAIDs, methotrexate    BP Readings from Last 4 Encounters:   11/22/22 134/83   02/22/22 122/68     Ht Readings from Last 4 Encounters:   11/22/22 5' 6" (1.676 m)   02/22/22 5' 6" (1.676 m)     Wt Readings from Last 4 Encounters:   11/22/22 72.8 kg (160 lb 7.9 oz)   02/22/22 83.9 kg (185 lb)     Recent Labs   Lab Result Units 11/22/22  1214   Creatinine mg/dL 0.7   ALT U/L 14   AST U/L 16   Hepatitis B Surface Ag  Non-reactive   Hep B Core Total Ab  Non-reactive     The goals of rheumatoid arthritis treatment include:  Relieving pain and suppressing inflammation  Achieving remission and preventing joint and organ damage  Increasing joint mobility and strength  Preventing infection and other complications of treatment  Reducing long term complications of rheumatoid arthritis  Improving or maintaining physical function and optimal well-being  Improving or " maintaining quality of life  Maintaining optimal therapy adherence  Minimizing and managing side effects    Goals of Therapy Status: Discussed (new start)    Assessment/Plan  Patient plans to start therapy on 01/26/23      Indication, dosage, appropriateness, effectiveness, safety and convenience of her specialty medication(s) were reviewed today.     Patient Education   Patient received education on the following:   Expectations and possible outcomes of therapy  Proper use, timely administration, and missed dose management  Duration of therapy  Side effects, including prevention, minimization, and management  Contraindications and safety precautions  New or changed medications, including prescribe and over the counter medications and supplements  Reviews recommended vaccinations, as appropriate  Storage, safe handling, and disposal      Tasks added this encounter   2/16/2023 - Refill Call (Auto Added)  10/23/2023 - Clinical - Follow Up Assesement (Annual)   Tasks due within next 3 months   No tasks due.     Mehrdad Garber, PharmD  [facility]  [address]  [facility] [address]  Phone: [phone]  Fax: [phone]

## 2023-01-28 ENCOUNTER — PATIENT MESSAGE (OUTPATIENT)
Dept: RHEUMATOLOGY | Facility: CLINIC | Age: 43
End: 2023-01-28
Payer: COMMERCIAL

## 2023-02-14 ENCOUNTER — PATIENT MESSAGE (OUTPATIENT)
Dept: RHEUMATOLOGY | Facility: CLINIC | Age: 43
End: 2023-02-14
Payer: COMMERCIAL

## 2023-02-15 DIAGNOSIS — M13.80 SERONEGATIVE ARTHRITIS: ICD-10-CM

## 2023-02-15 RX ORDER — HYDROXYCHLOROQUINE SULFATE 200 MG/1
200 TABLET, FILM COATED ORAL DAILY
Qty: 90 TABLET | Refills: 2 | Status: SHIPPED | OUTPATIENT
Start: 2023-02-15 | End: 2023-09-20

## 2023-02-15 RX ORDER — FOLIC ACID 1 MG/1
1 TABLET ORAL DAILY
Qty: 90 TABLET | Refills: 1 | Status: SHIPPED | OUTPATIENT
Start: 2023-02-15 | End: 2023-03-28

## 2023-02-15 RX ORDER — HYDROXYCHLOROQUINE SULFATE 200 MG/1
200 TABLET, FILM COATED ORAL DAILY
COMMUNITY
End: 2023-02-15 | Stop reason: SDUPTHER

## 2023-02-15 RX ORDER — METHOTREXATE 2.5 MG/1
15 TABLET ORAL
Qty: 72 TABLET | Refills: 1 | Status: SHIPPED | OUTPATIENT
Start: 2023-02-15 | End: 2023-03-28

## 2023-02-16 ENCOUNTER — SPECIALTY PHARMACY (OUTPATIENT)
Dept: PHARMACY | Facility: CLINIC | Age: 43
End: 2023-02-16
Payer: COMMERCIAL

## 2023-02-16 NOTE — TELEPHONE ENCOUNTER
Specialty Pharmacy - Refill Coordination    Specialty Medication Orders Linked to Encounter      Flowsheet Row Most Recent Value   Medication #1 adalimumab (HUMIRA,CF, PEN) 40 mg/0.4 mL PnKt (Order#[id number], Rx#[id number])            Refill Questions - Documented Responses      Flowsheet Row Most Recent Value   Patient Availability and HIPAA Verification    Does patient want to proceed with activity? Yes   HIPAA/medical authority confirmed? Yes   Relationship to patient of person spoken to? Self   Refill Screening Questions    Changes to allergies? No   Changes to medications? No   New conditions since last clinic visit? No   Unplanned office visit, urgent care, ED, or hospital admission in the last 4 weeks? No   How does patient/caregiver feel medication is working? Very good   Financial problems or insurance changes? No   How many doses of your specialty medications were missed in the last 4 weeks? 0   Would patient like to speak to a pharmacist? No   When does the patient need to receive the medication? 02/23/23   Refill Delivery Questions    How will the patient receive the medication? MEDRx   When does the patient need to receive the medication? 02/23/23   Shipping Address Temporary   Address in [facility] confirmed and updated if neccessary? Yes   Expected Copay ($) 5   Is the patient able to afford the medication copay? Yes   Payment Method CC on file   Days supply of Refill 28   Supplies needed? No supplies needed   Refill activity completed? Yes   Refill activity plan Refill scheduled   Shipment/Pickup Date: 02/17/23            Current Outpatient Medications   Medication Sig    adalimumab (HUMIRA,CF, PEN) 40 mg/0.4 mL PnKt Inject 0.4 mLs (40 mg total) into the skin every 14 (fourteen) days.    allopurinoL (ZYLOPRIM) 100 MG tablet Take 100 mg by mouth once daily.    atenoloL (TENORMIN) 25 MG tablet     calcium-vitamin D (CALCIUM 600 + D,3,) 600 mg-10 mcg (400 unit) Tab Take 1 tablet by mouth 2 (two)  times daily.    celecoxib (CELEBREX) 100 MG capsule Take 100 mg by mouth 2 (two) times daily.    colchicine (COLCRYS) 0.6 mg tablet Take by mouth.    cyclobenzaprine (FLEXERIL) 10 MG tablet Take 10 mg by mouth 3 (three) times daily as needed.    folic acid (FOLVITE) 1 MG tablet Take 1 tablet (1 mg total) by mouth once daily.    gabapentin (NEURONTIN) 300 MG capsule Take 300 mg by mouth once daily.    hydrOXYchloroQUINE (PLAQUENIL) 200 mg tablet Take 1 tablet (200 mg total) by mouth once daily.    ibuprofen (ADVIL,MOTRIN) 800 MG tablet ibuprofen 800 mg tablet   Take 1 tablet 3 times a day by oral route.    lisinopriL (PRINIVIL,ZESTRIL) 20 MG tablet     metFORMIN (GLUCOPHAGE-XR) 500 MG ER 24hr tablet     methotrexate 2.5 MG Tab Take 6 tablets (15 mg total) by mouth every 7 days.    MOBIC 15 mg tablet Take 1 tablet by mouth once a day as needed with food    pantoprazole (PROTONIX) 40 MG tablet     predniSONE (DELTASONE) 5 MG tablet May take 5-15 mg of prednisone daily PRN for RA related pain.    topiramate (TOPAMAX) 50 MG tablet Take 50 mg by mouth once daily.   Last reviewed on 2/15/2023  2:43 PM by Neeru Ortiz MA    Review of patient's allergies indicates:   Allergen Reactions    Penicillins Anaphylaxis and Swelling    Hydromorphone     Macrolide antibiotics Nausea And Vomiting     Patient states she can take Azithromycin     Last reviewed on  2/15/2023 2:43 PM by Neeru Ortiz      Tasks added this encounter   3/16/2023 - Refill Call (Auto Added)   Tasks due within next 3 months   No tasks due.     Brit Sousa, PharmD  [facility]  [address] [place] [address]  Phone: [phone]  Fax: [phone]

## 2023-02-27 ENCOUNTER — PATIENT MESSAGE (OUTPATIENT)
Dept: RHEUMATOLOGY | Facility: CLINIC | Age: 43
End: 2023-02-27
Payer: COMMERCIAL

## 2023-03-01 DIAGNOSIS — M13.80 SERONEGATIVE ARTHRITIS: ICD-10-CM

## 2023-03-01 RX ORDER — PREDNISONE 5 MG/1
TABLET ORAL
Qty: 90 TABLET | Refills: 1 | Status: SHIPPED | OUTPATIENT
Start: 2023-03-01 | End: 2023-12-22 | Stop reason: SDUPTHER

## 2023-03-01 RX ORDER — MULTIVITAMIN
1 TABLET ORAL 2 TIMES DAILY
Qty: 60 TABLET | Refills: 3 | Status: SHIPPED | OUTPATIENT
Start: 2023-03-01 | End: 2023-12-22 | Stop reason: SDUPTHER

## 2023-03-03 ENCOUNTER — TELEPHONE (OUTPATIENT)
Dept: RHEUMATOLOGY | Facility: CLINIC | Age: 43
End: 2023-03-03
Payer: COMMERCIAL

## 2023-03-16 ENCOUNTER — SPECIALTY PHARMACY (OUTPATIENT)
Dept: PHARMACY | Facility: CLINIC | Age: 43
End: 2023-03-16
Payer: COMMERCIAL

## 2023-03-16 NOTE — TELEPHONE ENCOUNTER
Specialty Pharmacy - Refill Coordination    Specialty Medication Orders Linked to Encounter      Flowsheet Row Most Recent Value   Medication #1 adalimumab (HUMIRA,CF, PEN) 40 mg/0.4 mL PnKt (Order#[id number], Rx#[id number])            Refill Questions - Documented Responses      Flowsheet Row Most Recent Value   Patient Availability and HIPAA Verification    Does patient want to proceed with activity? Yes   HIPAA/medical authority confirmed? Yes   Relationship to patient of person spoken to? Self   Refill Screening Questions    Changes to allergies? No   Changes to medications? No   New conditions since last clinic visit? No   Unplanned office visit, urgent care, ED, or hospital admission in the last 4 weeks? No   How does patient/caregiver feel medication is working? Very good   Financial problems or insurance changes? No   How many doses of your specialty medications were missed in the last 4 weeks? 0   Would patient like to speak to a pharmacist? No   When does the patient need to receive the medication? 03/23/23   Refill Delivery Questions    How will the patient receive the medication? MEDRx   When does the patient need to receive the medication? 03/23/23   Shipping Address Temporary   Address in [facility] confirmed and updated if neccessary? Yes   Expected Copay ($) 0   Is the patient able to afford the medication copay? Yes   Payment Method zero copay   Days supply of Refill 28   Supplies needed? No supplies needed   Refill activity completed? Yes   Refill activity plan Refill scheduled   Shipment/Pickup Date: 03/17/23            Current Outpatient Medications   Medication Sig    adalimumab (HUMIRA,CF, PEN) 40 mg/0.4 mL PnKt Inject 0.4 mLs (40 mg total) into the skin every 14 (fourteen) days.    allopurinoL (ZYLOPRIM) 100 MG tablet Take 100 mg by mouth once daily.    atenoloL (TENORMIN) 25 MG tablet     calcium-vitamin D (CALCIUM 600 + D,3,) 600 mg-10 mcg (400 unit) Tab Take 1 tablet by mouth 2 (two)  times daily.    celecoxib (CELEBREX) 100 MG capsule Take 100 mg by mouth 2 (two) times daily.    colchicine (COLCRYS) 0.6 mg tablet Take by mouth.    cyclobenzaprine (FLEXERIL) 10 MG tablet Take 10 mg by mouth 3 (three) times daily as needed.    folic acid (FOLVITE) 1 MG tablet Take 1 tablet (1 mg total) by mouth once daily.    gabapentin (NEURONTIN) 300 MG capsule Take 300 mg by mouth once daily.    hydrOXYchloroQUINE (PLAQUENIL) 200 mg tablet Take 1 tablet (200 mg total) by mouth once daily.    ibuprofen (ADVIL,MOTRIN) 800 MG tablet ibuprofen 800 mg tablet   Take 1 tablet 3 times a day by oral route.    lisinopriL (PRINIVIL,ZESTRIL) 20 MG tablet     metFORMIN (GLUCOPHAGE-XR) 500 MG ER 24hr tablet     methotrexate 2.5 MG Tab Take 6 tablets (15 mg total) by mouth every 7 days.    MOBIC 15 mg tablet Take 1 tablet by mouth once a day as needed with food    pantoprazole (PROTONIX) 40 MG tablet     predniSONE (DELTASONE) 5 MG tablet May take 5-15 mg of prednisone daily PRN for RA related pain.  Do not mix with NSAIDs (like Ibuprofen)    topiramate (TOPAMAX) 50 MG tablet Take 50 mg by mouth once daily.   Last reviewed on 2/15/2023  2:43 PM by Neeru Ortiz MA    Review of patient's allergies indicates:   Allergen Reactions    Penicillins Anaphylaxis and Swelling    Hydromorphone     Macrolide antibiotics Nausea And Vomiting     Patient states she can take Azithromycin     Last reviewed on  3/1/2023 8:15 AM by Braxton Contreras      Tasks added this encounter   4/13/2023 - Refill Call (Auto Added)   Tasks due within next 3 months   No tasks due.     [facility]  [address] [facility] [address]  Phone: [phone]  Fax: [phone]

## 2023-03-22 ENCOUNTER — LAB VISIT (OUTPATIENT)
Dept: LAB | Facility: HOSPITAL | Age: 43
End: 2023-03-22
Attending: INTERNAL MEDICINE
Payer: COMMERCIAL

## 2023-03-22 DIAGNOSIS — M19.90 INFLAMMATORY ARTHRITIS: ICD-10-CM

## 2023-03-22 DIAGNOSIS — M79.642 PAIN IN BOTH HANDS: ICD-10-CM

## 2023-03-22 DIAGNOSIS — M13.80 SERONEGATIVE ARTHRITIS: ICD-10-CM

## 2023-03-22 DIAGNOSIS — M79.641 PAIN IN BOTH HANDS: ICD-10-CM

## 2023-03-22 DIAGNOSIS — M15.9 PRIMARY OSTEOARTHRITIS INVOLVING MULTIPLE JOINTS: ICD-10-CM

## 2023-03-22 LAB
ALBUMIN SERPL BCP-MCNC: 4.1 G/DL (ref 3.5–5.2)
ALP SERPL-CCNC: 39 U/L (ref 55–135)
ALT SERPL W/O P-5'-P-CCNC: 21 U/L (ref 10–44)
ANION GAP SERPL CALC-SCNC: 13 MMOL/L (ref 8–16)
AST SERPL-CCNC: 16 U/L (ref 10–40)
BASOPHILS # BLD AUTO: 0.07 K/UL (ref 0–0.2)
BASOPHILS NFR BLD: 0.6 % (ref 0–1.9)
BILIRUB SERPL-MCNC: 0.8 MG/DL (ref 0.1–1)
BUN SERPL-MCNC: 7 MG/DL (ref 6–20)
CALCIUM SERPL-MCNC: 9.4 MG/DL (ref 8.7–10.5)
CHLORIDE SERPL-SCNC: 103 MMOL/L (ref 95–110)
CO2 SERPL-SCNC: 25 MMOL/L (ref 23–29)
CREAT SERPL-MCNC: 0.8 MG/DL (ref 0.5–1.4)
DIFFERENTIAL METHOD: ABNORMAL
EOSINOPHIL # BLD AUTO: 0.1 K/UL (ref 0–0.5)
EOSINOPHIL NFR BLD: 1 % (ref 0–8)
ERYTHROCYTE [DISTWIDTH] IN BLOOD BY AUTOMATED COUNT: 12.4 % (ref 11.5–14.5)
ERYTHROCYTE [SEDIMENTATION RATE] IN BLOOD BY WESTERGREN METHOD: 2 MM/HR (ref 0–20)
EST. GFR  (NO RACE VARIABLE): >60 ML/MIN/1.73 M^2
GLUCOSE SERPL-MCNC: 81 MG/DL (ref 70–110)
HCT VFR BLD AUTO: 42.9 % (ref 37–48.5)
HGB BLD-MCNC: 14.4 G/DL (ref 12–16)
IMM GRANULOCYTES # BLD AUTO: 0.07 K/UL (ref 0–0.04)
IMM GRANULOCYTES NFR BLD AUTO: 0.6 % (ref 0–0.5)
LYMPHOCYTES # BLD AUTO: 2.9 K/UL (ref 1–4.8)
LYMPHOCYTES NFR BLD: 27.2 % (ref 18–48)
MCH RBC QN AUTO: 33.1 PG (ref 27–31)
MCHC RBC AUTO-ENTMCNC: 33.6 G/DL (ref 32–36)
MCV RBC AUTO: 99 FL (ref 82–98)
MONOCYTES # BLD AUTO: 0.7 K/UL (ref 0.3–1)
MONOCYTES NFR BLD: 6.6 % (ref 4–15)
NEUTROPHILS # BLD AUTO: 6.9 K/UL (ref 1.8–7.7)
NEUTROPHILS NFR BLD: 64 % (ref 38–73)
NRBC BLD-RTO: 0 /100 WBC
PLATELET # BLD AUTO: 290 K/UL (ref 150–450)
PMV BLD AUTO: 9.4 FL (ref 9.2–12.9)
POTASSIUM SERPL-SCNC: 3.9 MMOL/L (ref 3.5–5.1)
PROT SERPL-MCNC: 7.2 G/DL (ref 6–8.4)
RBC # BLD AUTO: 4.35 M/UL (ref 4–5.4)
SODIUM SERPL-SCNC: 141 MMOL/L (ref 136–145)
WBC # BLD AUTO: 10.8 K/UL (ref 3.9–12.7)

## 2023-03-22 PROCEDURE — 85025 COMPLETE CBC W/AUTO DIFF WBC: CPT | Performed by: INTERNAL MEDICINE

## 2023-03-22 PROCEDURE — 36415 COLL VENOUS BLD VENIPUNCTURE: CPT | Mod: PO | Performed by: INTERNAL MEDICINE

## 2023-03-22 PROCEDURE — 85651 RBC SED RATE NONAUTOMATED: CPT | Performed by: INTERNAL MEDICINE

## 2023-03-22 PROCEDURE — 86480 TB TEST CELL IMMUN MEASURE: CPT | Performed by: INTERNAL MEDICINE

## 2023-03-22 PROCEDURE — 80053 COMPREHEN METABOLIC PANEL: CPT | Performed by: INTERNAL MEDICINE

## 2023-03-25 LAB
GAMMA INTERFERON BACKGROUND BLD IA-ACNC: 0.01 IU/ML
M TB IFN-G CD4+ BCKGRND COR BLD-ACNC: 0 IU/ML
MITOGEN IGNF BCKGRD COR BLD-ACNC: 9.99 IU/ML
TB GOLD PLUS: NEGATIVE
TB2 - NIL: 0.01 IU/ML

## 2023-03-28 ENCOUNTER — OFFICE VISIT (OUTPATIENT)
Dept: RHEUMATOLOGY | Facility: CLINIC | Age: 43
End: 2023-03-28
Payer: COMMERCIAL

## 2023-03-28 VITALS
WEIGHT: 145 LBS | HEIGHT: 66 IN | BODY MASS INDEX: 23.3 KG/M2 | HEART RATE: 78 BPM | DIASTOLIC BLOOD PRESSURE: 77 MMHG | SYSTOLIC BLOOD PRESSURE: 108 MMHG

## 2023-03-28 DIAGNOSIS — S63.592D COMPLEX TEAR OF TRIANGULAR FIBROCARTILAGE OF LEFT WRIST, SUBSEQUENT ENCOUNTER: ICD-10-CM

## 2023-03-28 DIAGNOSIS — M47.819 SPONDYLOARTHRITIS: ICD-10-CM

## 2023-03-28 DIAGNOSIS — M15.9 PRIMARY OSTEOARTHRITIS INVOLVING MULTIPLE JOINTS: ICD-10-CM

## 2023-03-28 DIAGNOSIS — Z15.89 HLA B27 (HLA B27 POSITIVE): ICD-10-CM

## 2023-03-28 DIAGNOSIS — Q76.49 BERTOLOTTI'S SYNDROME: ICD-10-CM

## 2023-03-28 DIAGNOSIS — Z51.81 MEDICATION MONITORING ENCOUNTER: ICD-10-CM

## 2023-03-28 DIAGNOSIS — Z79.899 HIGH RISK MEDICATION USE: ICD-10-CM

## 2023-03-28 DIAGNOSIS — M13.80 SERONEGATIVE ARTHRITIS: Primary | ICD-10-CM

## 2023-03-28 PROCEDURE — 4010F ACE/ARB THERAPY RXD/TAKEN: CPT | Mod: CPTII,S$GLB,, | Performed by: INTERNAL MEDICINE

## 2023-03-28 PROCEDURE — 3078F DIAST BP <80 MM HG: CPT | Mod: CPTII,S$GLB,, | Performed by: INTERNAL MEDICINE

## 2023-03-28 PROCEDURE — 3008F PR BODY MASS INDEX (BMI) DOCUMENTED: ICD-10-PCS | Mod: CPTII,S$GLB,, | Performed by: INTERNAL MEDICINE

## 2023-03-28 PROCEDURE — 3074F PR MOST RECENT SYSTOLIC BLOOD PRESSURE < 130 MM HG: ICD-10-PCS | Mod: CPTII,S$GLB,, | Performed by: INTERNAL MEDICINE

## 2023-03-28 PROCEDURE — 3074F SYST BP LT 130 MM HG: CPT | Mod: CPTII,S$GLB,, | Performed by: INTERNAL MEDICINE

## 2023-03-28 PROCEDURE — 99999 PR PBB SHADOW E&M-EST. PATIENT-LVL IV: CPT | Mod: PBBFAC,,, | Performed by: INTERNAL MEDICINE

## 2023-03-28 PROCEDURE — 4010F PR ACE/ARB THEARPY RXD/TAKEN: ICD-10-PCS | Mod: CPTII,S$GLB,, | Performed by: INTERNAL MEDICINE

## 2023-03-28 PROCEDURE — 99214 OFFICE O/P EST MOD 30 MIN: CPT | Mod: S$GLB,,, | Performed by: INTERNAL MEDICINE

## 2023-03-28 PROCEDURE — 99214 PR OFFICE/OUTPT VISIT, EST, LEVL IV, 30-39 MIN: ICD-10-PCS | Mod: S$GLB,,, | Performed by: INTERNAL MEDICINE

## 2023-03-28 PROCEDURE — 1159F MED LIST DOCD IN RCRD: CPT | Mod: CPTII,S$GLB,, | Performed by: INTERNAL MEDICINE

## 2023-03-28 PROCEDURE — 99999 PR PBB SHADOW E&M-EST. PATIENT-LVL IV: ICD-10-PCS | Mod: PBBFAC,,, | Performed by: INTERNAL MEDICINE

## 2023-03-28 PROCEDURE — 1159F PR MEDICATION LIST DOCUMENTED IN MEDICAL RECORD: ICD-10-PCS | Mod: CPTII,S$GLB,, | Performed by: INTERNAL MEDICINE

## 2023-03-28 PROCEDURE — 3078F PR MOST RECENT DIASTOLIC BLOOD PRESSURE < 80 MM HG: ICD-10-PCS | Mod: CPTII,S$GLB,, | Performed by: INTERNAL MEDICINE

## 2023-03-28 PROCEDURE — 3008F BODY MASS INDEX DOCD: CPT | Mod: CPTII,S$GLB,, | Performed by: INTERNAL MEDICINE

## 2023-03-28 RX ORDER — SULFASALAZINE 500 MG/1
500 TABLET ORAL 2 TIMES DAILY
Qty: 180 TABLET | Refills: 2 | Status: SHIPPED | OUTPATIENT
Start: 2023-03-28 | End: 2023-03-28 | Stop reason: SDUPTHER

## 2023-04-02 ENCOUNTER — PATIENT MESSAGE (OUTPATIENT)
Dept: RHEUMATOLOGY | Facility: CLINIC | Age: 43
End: 2023-04-02
Payer: COMMERCIAL

## 2023-04-06 DIAGNOSIS — M13.80 SERONEGATIVE ARTHRITIS: Primary | ICD-10-CM

## 2023-04-06 RX ORDER — LEFLUNOMIDE 10 MG/1
10 TABLET ORAL DAILY
Qty: 30 TABLET | Refills: 3 | Status: SHIPPED | OUTPATIENT
Start: 2023-04-06 | End: 2023-04-14 | Stop reason: SDUPTHER

## 2023-04-13 ENCOUNTER — SPECIALTY PHARMACY (OUTPATIENT)
Dept: PHARMACY | Facility: CLINIC | Age: 43
End: 2023-04-13
Payer: COMMERCIAL

## 2023-04-13 DIAGNOSIS — M13.80 SERONEGATIVE ARTHRITIS: ICD-10-CM

## 2023-04-13 RX ORDER — LEFLUNOMIDE 10 MG/1
10 TABLET ORAL DAILY
Qty: 30 TABLET | Refills: 3 | OUTPATIENT
Start: 2023-04-13 | End: 2023-05-13

## 2023-04-13 NOTE — TELEPHONE ENCOUNTER
Specialty Pharmacy - Refill Coordination    Specialty Medication Orders Linked to Encounter      Flowsheet Row Most Recent Value   Medication #1 adalimumab (HUMIRA,CF, PEN) 40 mg/0.4 mL PnKt (Order#[id number], Rx#[id number])            Refill Questions - Documented Responses      Flowsheet Row Most Recent Value   Patient Availability and HIPAA Verification    Does patient want to proceed with activity? Yes   HIPAA/medical authority confirmed? Yes   Relationship to patient of person spoken to? Self   Refill Screening Questions    Changes to allergies? No   Changes to medications? No   New conditions since last clinic visit? No   Unplanned office visit, urgent care, ED, or hospital admission in the last 4 weeks? No   How does patient/caregiver feel medication is working? Good   Financial problems or insurance changes? No   How many doses of your specialty medications were missed in the last 4 weeks? 0   Would patient like to speak to a pharmacist? No   When does the patient need to receive the medication? 04/20/23   Refill Delivery Questions    How will the patient receive the medication? MEDRx   When does the patient need to receive the medication? 04/20/23   Shipping Address Home   Address in [facility] confirmed and updated if neccessary? Yes   Expected Copay ($) 0   Is the patient able to afford the medication copay? Yes   Payment Method zero copay   Days supply of Refill 28   Supplies needed? No supplies needed   Refill activity completed? Yes   Refill activity plan Refill scheduled   Shipment/Pickup Date: 04/17/23            Current Outpatient Medications   Medication Sig    adalimumab (HUMIRA,CF, PEN) 40 mg/0.4 mL PnKt Inject 0.4 mLs (40 mg total) into the skin every 14 (fourteen) days.    atenoloL (TENORMIN) 25 MG tablet     calcium-vitamin D (CALCIUM 600 + D,3,) 600 mg-10 mcg (400 unit) Tab Take 1 tablet by mouth 2 (two) times daily.    celecoxib (CELEBREX) 100 MG capsule Take 100 mg by mouth 2 (two)  times daily.    cyclobenzaprine (FLEXERIL) 10 MG tablet Take 10 mg by mouth 3 (three) times daily as needed.    gabapentin (NEURONTIN) 300 MG capsule Take 300 mg by mouth once daily.    hydrOXYchloroQUINE (PLAQUENIL) 200 mg tablet Take 1 tablet (200 mg total) by mouth once daily.    ibuprofen (ADVIL,MOTRIN) 800 MG tablet ibuprofen 800 mg tablet   Take 1 tablet 3 times a day by oral route.    leflunomide (ARAVA) 10 MG Tab Take 1 tablet (10 mg total) by mouth once daily.    lisinopriL (PRINIVIL,ZESTRIL) 20 MG tablet     metFORMIN (GLUCOPHAGE-XR) 500 MG ER 24hr tablet     MOBIC 15 mg tablet Take 1 tablet by mouth once a day as needed with food    pantoprazole (PROTONIX) 40 MG tablet     predniSONE (DELTASONE) 5 MG tablet May take 5-15 mg of prednisone daily PRN for RA related pain.  Do not mix with NSAIDs (like Ibuprofen)    sulfaSALAzine (AZULFIDINE) 500 mg Tab Take 1 tablet (500 mg total) by mouth 2 (two) times a day.    topiramate (TOPAMAX) 50 MG tablet Take 50 mg by mouth once daily.   Last reviewed on 3/28/2023 12:42 PM by Summer Godwin CMA    Review of patient's allergies indicates:   Allergen Reactions    Penicillins Anaphylaxis and Swelling    Hydromorphone     Macrolide antibiotics Nausea And Vomiting     Patient states she can take Azithromycin     Last reviewed on  4/13/2023 9:01 AM by Wendi Hauser      Tasks added this encounter   5/11/2023 - Refill Call (Auto Added)   Tasks due within next 3 months   No tasks due.     [facility]  [address] [facility] [address]  Phone: [phone]  Fax: [phone]

## 2023-04-14 RX ORDER — LEFLUNOMIDE 10 MG/1
10 TABLET ORAL DAILY
Qty: 90 TABLET | Refills: 0 | Status: SHIPPED | OUTPATIENT
Start: 2023-04-14 | End: 2023-05-15

## 2023-04-18 ENCOUNTER — TELEPHONE (OUTPATIENT)
Dept: PHARMACY | Facility: CLINIC | Age: 43
End: 2023-04-18
Payer: COMMERCIAL

## 2023-04-18 NOTE — TELEPHONE ENCOUNTER
FF team confirmed that [facility] will  package at incorrect address and bring to correct address today. Outgoing call to pt to inform. No answer/LVM.

## 2023-04-18 NOTE — TELEPHONE ENCOUNTER
Incoming call from pt stating that she received a text that her Humira was delivered, but she did not get the box. When reviewing address, it was sent to incorrect address on file. Alerted FF team to see about reaching out to [facility]  to see if able to obtain. Informed pt once hear back from , will reach out. Voiced understanding.

## 2023-04-21 ENCOUNTER — PATIENT MESSAGE (OUTPATIENT)
Dept: RHEUMATOLOGY | Facility: CLINIC | Age: 43
End: 2023-04-21
Payer: COMMERCIAL

## 2023-04-25 RX ORDER — METHYLPREDNISOLONE 4 MG/1
TABLET ORAL
Qty: 21 EACH | Refills: 0 | Status: SHIPPED | OUTPATIENT
Start: 2023-04-25 | End: 2023-05-16

## 2023-04-25 RX ORDER — UPADACITINIB 15 MG/1
15 TABLET, EXTENDED RELEASE ORAL DAILY
Qty: 90 TABLET | Refills: 1 | Status: SHIPPED | OUTPATIENT
Start: 2023-04-25 | End: 2023-05-15

## 2023-05-01 ENCOUNTER — TELEPHONE (OUTPATIENT)
Dept: RHEUMATOLOGY | Facility: CLINIC | Age: 43
End: 2023-05-01
Payer: COMMERCIAL

## 2023-05-01 ENCOUNTER — TELEPHONE (OUTPATIENT)
Dept: PHARMACY | Facility: CLINIC | Age: 43
End: 2023-05-01
Payer: COMMERCIAL

## 2023-05-01 NOTE — TELEPHONE ENCOUNTER
"Returned patients phone call. Patient is very upset. Patient stated that she sent a message to Dr. Contreras regarding questions that she had on a particular medication on Friday 04/21/2023. She did not hear back from Dr. Contreras until Tuesday 04/25/2023 due to Dr. Contreras being out of the clinic on Mondays. When Dr. Contreras did reply to her message he only replied to a part of it and not the whole message. She would like to switch providers in the clinic because she feels like Dr. Contreras does not care about her treatment. Patient has several people that have recommended Dr. Sandoval and she would like to make an appointment to see Dr. Sandoval if possible. Expressed my sincere apologies to her and stated that I will forward this message to Dr. Allen staff regarding an appointment with Dr. Sandoval. Patient thanked me for the return phone call and verbalized understanding. All questions answered.      Summer Godwin (Allye), [facility]  Rheumatology Department    "

## 2023-05-01 NOTE — TELEPHONE ENCOUNTER
Dinorah, this is Mehrdad Garber, clinical pharmacist with [facility] that is part of your care team.  We have begun working on your prescription that your doctor has sent us. Our next steps include:     Working with your insurance company to obtain approval for your medication  Working with you to ensure your medication is affordable     We will be calling you along the way with updates on your medication but if you have any concerns or receive information that you would like to discuss please reach us at [phone].    Welcome call outcome: Patient/caregiver reached

## 2023-05-01 NOTE — TELEPHONE ENCOUNTER
----- Message from Sanchez Weiner sent at 5/1/2023 12:36 PM CDT -----  Contact: [phone]  Pt is calling to speak with nurse . Please call back at [phone] . Thanksdj

## 2023-05-05 ENCOUNTER — TELEPHONE (OUTPATIENT)
Dept: RHEUMATOLOGY | Facility: CLINIC | Age: 43
End: 2023-05-05
Payer: COMMERCIAL

## 2023-05-05 DIAGNOSIS — M13.80 SERONEGATIVE ARTHRITIS: ICD-10-CM

## 2023-05-05 RX ORDER — PREDNISONE 5 MG/1
TABLET ORAL
Qty: 90 TABLET | Refills: 1 | Status: CANCELLED | OUTPATIENT
Start: 2023-05-05

## 2023-05-05 NOTE — TELEPHONE ENCOUNTER
----- Message from Lucia Patel sent at 5/5/2023  9:39 AM CDT -----  Regarding: please advise  Who Called:VALENTIN ALDRIDGE [[id number]]         What is the reqeust in detail: Requesting call back to discuss pt is needing call from nurse. Please advise         Can the clinic reply by [facility]?no         Best Call Back Number:[phone]           Additional Information:

## 2023-05-09 NOTE — TELEPHONE ENCOUNTER
"Attempted to return patients phone call. Left v/m for patient to call back at earliest convenience.    Summer Godwin (Allye), [facility]  Rheumatology Department    "

## 2023-05-10 ENCOUNTER — TELEPHONE (OUTPATIENT)
Dept: RHEUMATOLOGY | Facility: CLINIC | Age: 43
End: 2023-05-10
Payer: COMMERCIAL

## 2023-05-10 NOTE — TELEPHONE ENCOUNTER
"Returned patients phone call. Apologized to patient that I was out of the office due to illness the past week and was just now able to return her phone call. Appointment scheduled this coming Monday 05/15/2023 at 9:15 at our [place] location with Dr. Sandoval. Patient thanked me for everything and verbalized understanding. All questions answered.      Summer Godwin (Allye), [facility]  Rheumatology Department    "

## 2023-05-10 NOTE — TELEPHONE ENCOUNTER
You  Maria Guadalupe Louise 2 hours ago (11:48 AM)     You 2 hours ago (11:48 AM)     AS  ----- Message from Queenie Weiner sent at 5/10/2023 10:33 AM CDT -----  Contact: Maria Guadalupe  Pt is calling in regards to Alley giving her a call back at [phone]           Thanks  [facility]

## 2023-05-10 NOTE — TELEPHONE ENCOUNTER
----- Message from Queenie Weiner sent at 5/10/2023 10:33 AM CDT -----  Contact: Maria Guadalupe Costello is calling in regards to Alley giving her a call back at [phone]        Thanks  OLIMPIA

## 2023-05-10 NOTE — TELEPHONE ENCOUNTER
"See other telephone call from 05/01/2023.    Summer Stanley" [place], [facility]  Rheumatology Department   "

## 2023-05-15 ENCOUNTER — LAB VISIT (OUTPATIENT)
Dept: LAB | Facility: HOSPITAL | Age: 43
End: 2023-05-15
Attending: INTERNAL MEDICINE
Payer: COMMERCIAL

## 2023-05-15 ENCOUNTER — OFFICE VISIT (OUTPATIENT)
Dept: RHEUMATOLOGY | Facility: CLINIC | Age: 43
End: 2023-05-15
Payer: COMMERCIAL

## 2023-05-15 ENCOUNTER — TELEPHONE (OUTPATIENT)
Dept: PHARMACY | Facility: CLINIC | Age: 43
End: 2023-05-15
Payer: COMMERCIAL

## 2023-05-15 VITALS
HEART RATE: 103 BPM | SYSTOLIC BLOOD PRESSURE: 122 MMHG | HEIGHT: 66 IN | WEIGHT: 148.56 LBS | BODY MASS INDEX: 23.88 KG/M2 | DIASTOLIC BLOOD PRESSURE: 80 MMHG

## 2023-05-15 DIAGNOSIS — Z79.899 HIGH RISK MEDICATION USE: ICD-10-CM

## 2023-05-15 DIAGNOSIS — Z51.81 MEDICATION MONITORING ENCOUNTER: ICD-10-CM

## 2023-05-15 DIAGNOSIS — M13.80 SERONEGATIVE ARTHRITIS: Primary | ICD-10-CM

## 2023-05-15 DIAGNOSIS — M13.80 SERONEGATIVE ARTHRITIS: ICD-10-CM

## 2023-05-15 DIAGNOSIS — Z15.89 HLA B27 (HLA B27 POSITIVE): ICD-10-CM

## 2023-05-15 DIAGNOSIS — S63.591A COMPLEX TEAR OF TRIANGULAR FIBROCARTILAGE OF RIGHT WRIST, INITIAL ENCOUNTER: ICD-10-CM

## 2023-05-15 LAB
CRP SERPL-MCNC: 1.2 MG/L (ref 0–8.2)
ERYTHROCYTE [SEDIMENTATION RATE] IN BLOOD BY WESTERGREN METHOD: 8 MM/HR (ref 0–20)

## 2023-05-15 PROCEDURE — 4010F ACE/ARB THERAPY RXD/TAKEN: CPT | Mod: CPTII,S$GLB,, | Performed by: INTERNAL MEDICINE

## 2023-05-15 PROCEDURE — 85651 RBC SED RATE NONAUTOMATED: CPT | Performed by: INTERNAL MEDICINE

## 2023-05-15 PROCEDURE — 99999 PR PBB SHADOW E&M-EST. PATIENT-LVL III: CPT | Mod: PBBFAC,,, | Performed by: INTERNAL MEDICINE

## 2023-05-15 PROCEDURE — 1160F PR REVIEW ALL MEDS BY PRESCRIBER/CLIN PHARMACIST DOCUMENTED: ICD-10-PCS | Mod: CPTII,S$GLB,, | Performed by: INTERNAL MEDICINE

## 2023-05-15 PROCEDURE — 3008F BODY MASS INDEX DOCD: CPT | Mod: CPTII,S$GLB,, | Performed by: INTERNAL MEDICINE

## 2023-05-15 PROCEDURE — 4010F PR ACE/ARB THEARPY RXD/TAKEN: ICD-10-PCS | Mod: CPTII,S$GLB,, | Performed by: INTERNAL MEDICINE

## 2023-05-15 PROCEDURE — 99215 PR OFFICE/OUTPT VISIT, EST, LEVL V, 40-54 MIN: ICD-10-PCS | Mod: S$GLB,,, | Performed by: INTERNAL MEDICINE

## 2023-05-15 PROCEDURE — 99999 PR PBB SHADOW E&M-EST. PATIENT-LVL III: ICD-10-PCS | Mod: PBBFAC,,, | Performed by: INTERNAL MEDICINE

## 2023-05-15 PROCEDURE — 99215 OFFICE O/P EST HI 40 MIN: CPT | Mod: S$GLB,,, | Performed by: INTERNAL MEDICINE

## 2023-05-15 PROCEDURE — 86140 C-REACTIVE PROTEIN: CPT | Performed by: INTERNAL MEDICINE

## 2023-05-15 PROCEDURE — 1160F RVW MEDS BY RX/DR IN RCRD: CPT | Mod: CPTII,S$GLB,, | Performed by: INTERNAL MEDICINE

## 2023-05-15 PROCEDURE — 3074F SYST BP LT 130 MM HG: CPT | Mod: CPTII,S$GLB,, | Performed by: INTERNAL MEDICINE

## 2023-05-15 PROCEDURE — 3079F DIAST BP 80-89 MM HG: CPT | Mod: CPTII,S$GLB,, | Performed by: INTERNAL MEDICINE

## 2023-05-15 PROCEDURE — 3074F PR MOST RECENT SYSTOLIC BLOOD PRESSURE < 130 MM HG: ICD-10-PCS | Mod: CPTII,S$GLB,, | Performed by: INTERNAL MEDICINE

## 2023-05-15 PROCEDURE — 3008F PR BODY MASS INDEX (BMI) DOCUMENTED: ICD-10-PCS | Mod: CPTII,S$GLB,, | Performed by: INTERNAL MEDICINE

## 2023-05-15 PROCEDURE — 1159F MED LIST DOCD IN RCRD: CPT | Mod: CPTII,S$GLB,, | Performed by: INTERNAL MEDICINE

## 2023-05-15 PROCEDURE — 36415 COLL VENOUS BLD VENIPUNCTURE: CPT | Mod: PO | Performed by: INTERNAL MEDICINE

## 2023-05-15 PROCEDURE — 3079F PR MOST RECENT DIASTOLIC BLOOD PRESSURE 80-89 MM HG: ICD-10-PCS | Mod: CPTII,S$GLB,, | Performed by: INTERNAL MEDICINE

## 2023-05-15 PROCEDURE — 1159F PR MEDICATION LIST DOCUMENTED IN MEDICAL RECORD: ICD-10-PCS | Mod: CPTII,S$GLB,, | Performed by: INTERNAL MEDICINE

## 2023-05-15 RX ORDER — LISINOPRIL 10 MG/1
10 TABLET ORAL
COMMUNITY
Start: 2023-04-14

## 2023-05-15 RX ORDER — FOLIC ACID 1 MG/1
1000 TABLET ORAL
COMMUNITY
Start: 2023-04-23

## 2023-05-15 RX ORDER — UPADACITINIB 15 MG/1
15 TABLET, EXTENDED RELEASE ORAL DAILY
Qty: 30 TABLET | Refills: 11 | Status: ACTIVE | OUTPATIENT
Start: 2023-05-15 | End: 2024-04-02

## 2023-05-15 NOTE — TELEPHONE ENCOUNTER
Dinorah, this is Mehrdad Garber, clinical pharmacist with [facility] that is part of your care team.  We have begun working on your prescription that your doctor has sent us. Our next steps include:     Working with your insurance company to obtain approval for your medication  Working with you to ensure your medication is affordable     We will be calling you along the way with updates on your medication but if you have any concerns or receive information that you would like to discuss please reach us at [phone].    Welcome call outcome: Left voicemail

## 2023-05-15 NOTE — PROGRESS NOTES
RHEUMATOLOGY CLINIC FOLLOW UP VISIT  Chief complaints, HPI, ROS, EXAM, Assessment & Plans:-  Maria Guadalupe Woods a 43 y.o. pleasant female comes in for follow up visit. 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 Her symptoms resolved on humira except persistent pain over the right wrist. MRI right wrist showed complex tear of right TFCC associated with chronic deformity of right wrist. The latera right wrist never improved with any interventions done so far. For persistent pain, she was advised by  to try rinvoq.Rheumatological ROS negative otherwise. Exam shows tenderness right ulnar TFCC. No effusion. No synovitis. No dactylitis. .    1. Seronegative arthritis    2. HLA B27 (HLA B27 positive)    3. High risk medication use    4. Medication monitoring encounter    5. Complex tear of triangular fibrocartilage of right wrist, initial encounter      Problem List Items Addressed This Visit       Seronegative arthritis - Primary    Relevant Medications    upadacitinib (RINVOQ) 15 mg 24 hr tablet    Other Relevant Orders    C-Reactive Protein    Sedimentation rate    HLA B27 (HLA B27 positive)    Relevant Medications    upadacitinib (RINVOQ) 15 mg 24 hr tablet    Other Relevant Orders    C-Reactive Protein    Sedimentation rate    Complex tear of triangular fibrocartilage of right wrist    Medication monitoring encounter    High risk medication use       Labs reviewed today:-   Most Recent   HLA B27 Result Positive  11/22/22 12:14           I discussed the diagnosis of seronegative RA and its treatment options. I advised that I agree with trial of Rinvoq since it has shown better efficacy when compared to Humira.   But I do not think that the right wrist pain is secondary to RA since there was no synovitis/tenosynovitis on exam except tenderness. Along with her MRI showing  TFCC tear and deformity on exam. I suspect that the persistent right wrist pain is secondary to her tear . Consult Orthopedics.   I have explained all of the above in detail and the patient understands all of the current recommendation(s). I have answered all questions to the best of my ability and to their complete satisfaction.     Total time spent 44 minutes including time needed to review the records, the   patient evaluation, documentation, face-to-face discussion with the patient,   more than 50% of the time was spent on coordination of care and counseling.    Level V visit.  # Follow up in about 6 months (around 11/15/2023).      Disclaimer: This note was prepared using voice recognition system and is likely to have sound alike errors and is not proof read.  Please call me with any questions.

## 2023-05-16 ENCOUNTER — PATIENT MESSAGE (OUTPATIENT)
Dept: RHEUMATOLOGY | Facility: CLINIC | Age: 43
End: 2023-05-16
Payer: COMMERCIAL

## 2023-05-16 ENCOUNTER — TELEPHONE (OUTPATIENT)
Dept: RHEUMATOLOGY | Facility: CLINIC | Age: 43
End: 2023-05-16
Payer: COMMERCIAL

## 2023-05-16 DIAGNOSIS — M13.80 SERONEGATIVE ARTHRITIS: Primary | ICD-10-CM

## 2023-05-16 DIAGNOSIS — Z15.89 HLA B27 (HLA B27 POSITIVE): ICD-10-CM

## 2023-05-16 NOTE — TELEPHONE ENCOUNTER
----- Message from Mejia Dominguez sent at 5/16/2023 11:26 AM CDT -----  Contact: pro  Patient stated that rinvoq was approved but she need humira to be sent to [facility] so she can have it for Thursday. Please call her back at [phone].          Thanks  DD

## 2023-05-17 ENCOUNTER — SPECIALTY PHARMACY (OUTPATIENT)
Dept: PHARMACY | Facility: CLINIC | Age: 43
End: 2023-05-17
Payer: COMMERCIAL

## 2023-05-17 DIAGNOSIS — M13.80 SERONEGATIVE INFLAMMATORY ARTHRITIS: Primary | ICD-10-CM

## 2023-05-17 RX ORDER — ADALIMUMAB 40MG/0.4ML
40 KIT SUBCUTANEOUS
Qty: 1 PEN | Refills: 0 | Status: SHIPPED | OUTPATIENT
Start: 2023-05-17 | End: 2023-05-19

## 2023-05-17 NOTE — TELEPHONE ENCOUNTER
Specialty Pharmacy - Initial Clinical Assessment    Specialty Medication Orders Linked to Encounter      Flowsheet Row Most Recent Value   Medication #1 upadacitinib (RINVOQ) 15 mg 24 hr tablet (Order#[id number], Rx#[id number])          Patient Diagnosis   M13.80 - Seronegative arthritis    Subjective    Maria Guadalupe Louise is a 43 y.o. female, who is followed by the specialty pharmacy service for management and education.    Recent Encounters       Date Type Provider Description    05/17/2023 Specialty Pharmacy Mehrdad Garber PharmD Initial Clinical Assessment    05/17/2023 Specialty Pharmacy Mehrdad Garber PharmD Referral Authorization    04/13/2023 Specialty Pharmacy Tara Mulligan Refill Coordination    03/16/2023 Specialty Pharmacy Janis Cross Refill Coordination    02/16/2023 Specialty Pharmacy Brit Sousa, Elizabeth Refill Coordination            Current Outpatient Medications   Medication Sig Note    calcium-vitamin D (CALCIUM 600 + D,3,) 600 mg-10 mcg (400 unit) Tab Take 1 tablet by mouth 2 (two) times daily.     celecoxib (CELEBREX) 100 MG capsule Take 100 mg by mouth 2 (two) times daily.     folic acid (FOLVITE) 1 MG tablet Take 1,000 mcg by mouth. 5/17/2023: Patient reports not taking/ discontinued.    hydrOXYchloroQUINE (PLAQUENIL) 200 mg tablet Take 1 tablet (200 mg total) by mouth once daily. 5/17/2023: Patient reports not taking/ discontinued.    lisinopriL (PRINIVIL,ZESTRIL) 20 MG tablet      lisinopriL 10 MG tablet Take 10 mg by mouth.     metFORMIN (GLUCOPHAGE-XR) 500 MG ER 24hr tablet      pantoprazole (PROTONIX) 40 MG tablet      predniSONE (DELTASONE) 5 MG tablet May take 5-15 mg of prednisone daily PRN for RA related pain.  Do not mix with NSAIDs (like Ibuprofen) 5/17/2023: Patient reports not taking/ discontinued.    topiramate (TOPAMAX) 50 MG tablet Take 50 mg by mouth once daily. 5/17/2023: Patient reports not taking.    upadacitinib (RINVOQ) 15 mg 24 hr tablet Take 1 tablet (15 mg total) by  mouth once daily.    Last reviewed on 5/17/2023  9:39 AM by Mehrdad Garber PharmD    Review of patient's allergies indicates:   Allergen Reactions    Penicillins Anaphylaxis and Swelling    Hydromorphone     Macrolide antibiotics Nausea And Vomiting     Patient states she can take Azithromycin    Last reviewed on  5/17/2023 9:39 AM by Mehrdad Garber    Drug Interactions    Drug interactions evaluated: yes  Clinically relevant drug interactions identified: no  Provided the patient with educational material regarding drug interactions: not applicable         Adverse Effects    Arthralgias: Pos  *All other systems reviewed and are negative       Assessment Questions - Documented Responses      Flowsheet Row Most Recent Value   Assessment    Medication Reconciliation completed for patient Yes   During the past 4 weeks, has patient missed any activities due to condition or medication? No   During the past 4 weeks, did patient have any of the following urgent care visits? None   Goals of Therapy Status Discussed (new start)   Status of the patients ability to self-administer: Is Able   All education points have been covered with patient? Yes, supplemental printed education provided   Assesment completed? Yes   Plan Therapy being initiated   Do you need to open a clinical intervention (i-vent)? No   Do you want to schedule first shipment? Yes          Refill Questions - Documented Responses      Flowsheet Row Most Recent Value   Refill Screening Questions    When does the patient need to receive the medication? 05/18/23   Refill Delivery Questions    How will the patient receive the medication? MEDRx   When does the patient need to receive the medication? 05/18/23   Shipping Address Temporary   Address in [facility] confirmed and updated if neccessary? Yes   Expected Copay ($) 0   Is the patient able to afford the medication copay? Yes   Payment Method zero copay   Days supply of Refill 30   Supplies needed?  "No supplies needed   Refill activity completed? Yes   Refill activity plan Refill scheduled   Shipment/Pickup Date: 05/17/23            Objective    She has a past medical history of SVT (supraventricular tachycardia).    Tried/failed medications: Methotrexate, NSAIDs, sulfasalazine, Humira,    BP Readings from Last 4 Encounters:   05/15/23 122/80   03/28/23 108/77   11/22/22 134/83   02/22/22 122/68     Ht Readings from Last 4 Encounters:   05/15/23 5' 6" (1.676 m)   03/28/23 5' 6" (1.676 m)   11/22/22 5' 6" (1.676 m)   02/22/22 5' 6" (1.676 m)     Wt Readings from Last 4 Encounters:   05/15/23 67.4 kg (148 lb 9.4 oz)   03/28/23 65.8 kg (145 lb)   11/22/22 72.8 kg (160 lb 7.9 oz)   02/22/22 83.9 kg (185 lb)       The goals of rheumatoid arthritis treatment include:  Relieving pain and suppressing inflammation  Achieving remission and preventing joint and organ damage  Increasing joint mobility and strength  Preventing infection and other complications of treatment  Reducing long term complications of rheumatoid arthritis  Improving or maintaining physical function and optimal well-being  Improving or maintaining quality of life  Maintaining optimal therapy adherence  Minimizing and managing side effects    Goals of Therapy Status: Discussed (new start)    Assessment/Plan  Patient plans to start therapy on 05/18/23      Indication, dosage, appropriateness, effectiveness, safety and convenience of her specialty medication(s) were reviewed today.     Patient Education   Patient received education on the following:   Expectations and possible outcomes of therapy  Proper use, timely administration, and missed dose management  Duration of therapy  Side effects, including prevention, minimization, and management  Contraindications and safety precautions  New or changed medications, including prescribe and over the counter medications and supplements  Reviews recommended vaccinations, as appropriate  Storage, safe " handling, and disposal      Tasks added this encounter   2/19/2024 - Clinical Assessment (1 year recurrence)   Tasks due within next 3 months   No tasks due.     Mehrdad Garber, PharmD  [facility]  [address]  [facility] [address]  Phone: [phone]  Fax: [phone]

## 2023-05-23 ENCOUNTER — PATIENT MESSAGE (OUTPATIENT)
Dept: RHEUMATOLOGY | Facility: CLINIC | Age: 43
End: 2023-05-23
Payer: COMMERCIAL

## 2023-05-26 DIAGNOSIS — Z79.899 HIGH RISK MEDICATION USE: ICD-10-CM

## 2023-05-26 DIAGNOSIS — Z15.89 HLA B27 (HLA B27 POSITIVE): Primary | ICD-10-CM

## 2023-05-26 DIAGNOSIS — M47.819 SPONDYLOARTHRITIS: ICD-10-CM

## 2023-06-09 ENCOUNTER — SPECIALTY PHARMACY (OUTPATIENT)
Dept: PHARMACY | Facility: CLINIC | Age: 43
End: 2023-06-09
Payer: COMMERCIAL

## 2023-06-09 NOTE — TELEPHONE ENCOUNTER
Received incoming call from patient regarding Rinvoq refill. Claim rejects with Plan Limitations Exceeded. Called PA dept at [phone] and was advised Rinvoq is RTS until 6/10. Informed pt [facility] will reach out on 6/12 to arrange refill.

## 2023-06-12 NOTE — TELEPHONE ENCOUNTER
Specialty Pharmacy - Refill Coordination    Specialty Medication Orders Linked to Encounter      Flowsheet Row Most Recent Value   Medication #1 upadacitinib (RINVOQ) 15 mg 24 hr tablet (Order#[id number], Rx#[id number])            Refill Questions - Documented Responses      Flowsheet Row Most Recent Value   Refill Screening Questions    Changes to allergies? No   Changes to medications? No   New conditions since last clinic visit? No   Unplanned office visit, urgent care, ED, or hospital admission in the last 4 weeks? No   How does patient/caregiver feel medication is working? Good   Financial problems or insurance changes? No   How many doses of your specialty medications were missed in the last 4 weeks? 0   Would patient like to speak to a pharmacist? No   When does the patient need to receive the medication? 06/14/23   Refill Delivery Questions    How will the patient receive the medication? MEDRx   When does the patient need to receive the medication? 06/14/23   Shipping Address Temporary   Address in [facility] confirmed and updated if neccessary? Yes   Expected Copay ($) 0   Is the patient able to afford the medication copay? Yes   Payment Method zero copay   Days supply of Refill 30   Supplies needed? No supplies needed   Refill activity completed? Yes   Refill activity plan Refill scheduled   Shipment/Pickup Date: 06/12/23            Current Outpatient Medications   Medication Sig    calcium-vitamin D (CALCIUM 600 + D,3,) 600 mg-10 mcg (400 unit) Tab Take 1 tablet by mouth 2 (two) times daily.    celecoxib (CELEBREX) 100 MG capsule Take 100 mg by mouth 2 (two) times daily.    folic acid (FOLVITE) 1 MG tablet Take 1,000 mcg by mouth.    hydrOXYchloroQUINE (PLAQUENIL) 200 mg tablet Take 1 tablet (200 mg total) by mouth once daily.    lisinopriL (PRINIVIL,ZESTRIL) 20 MG tablet     lisinopriL 10 MG tablet Take 10 mg by mouth.    metFORMIN (GLUCOPHAGE-XR) 500 MG ER 24hr tablet     pantoprazole (PROTONIX)  40 MG tablet     predniSONE (DELTASONE) 5 MG tablet May take 5-15 mg of prednisone daily PRN for RA related pain.  Do not mix with NSAIDs (like Ibuprofen)    topiramate (TOPAMAX) 50 MG tablet Take 50 mg by mouth once daily.    upadacitinib (RINVOQ) 15 mg 24 hr tablet Take 1 tablet (15 mg total) by mouth once daily.   Last reviewed on 5/17/2023  9:39 AM by Mehrdad Garber, PharmD    Review of patient's allergies indicates:   Allergen Reactions    Penicillins Anaphylaxis and Swelling    Hydromorphone     Macrolide antibiotics Nausea And Vomiting     Patient states she can take Azithromycin     Last reviewed on  5/17/2023 9:39 AM by Mehrdad Garber      Tasks added this encounter   No tasks added.   Tasks due within next 3 months   6/10/2023 - Refill Coordination Outreach (1 time occurrence)     Hansa Merrill, Patient Care Assistant  [facility]  [address]  [facility] [address]  Phone: [phone]  Fax: [phone]

## 2023-06-13 ENCOUNTER — SPECIALTY PHARMACY (OUTPATIENT)
Dept: PHARMACY | Facility: CLINIC | Age: 43
End: 2023-06-13
Payer: COMMERCIAL

## 2023-06-13 NOTE — TELEPHONE ENCOUNTER
Received incoming call from patient to report that she will be going out of [place] on 7[place] and will need a Rinvoq refill before that time. Confirmed Rinvoq is RTS until 7/4. [facility] is closed on 7/4, however, refill call is scheduled for 7/5. Pt aware to contact [facility] in the event she has not already heard from us on 7/5 to ensure delivery for 7/6 - prior to vacation departure.

## 2023-07-03 NOTE — TELEPHONE ENCOUNTER
Incoming call from patient. Reports leaving the country 7/8 and will be gone for a few weeks. Patient needs fill prior to leaving the country, but if she receives month supply will have enough medication.    RTS 7/4. Pt ok for call back 7/5. Will call [facility] if she does not receive call from [facility] by afternoon.

## 2023-07-06 ENCOUNTER — SPECIALTY PHARMACY (OUTPATIENT)
Dept: PHARMACY | Facility: CLINIC | Age: 43
End: 2023-07-06
Payer: COMMERCIAL

## 2023-07-06 NOTE — TELEPHONE ENCOUNTER
Specialty Pharmacy - Refill Coordination    Specialty Medication Orders Linked to Encounter      Flowsheet Row Most Recent Value   Medication #1 upadacitinib (RINVOQ) 15 mg 24 hr tablet (Order#[id number], Rx#[id number])            Refill Questions - Documented Responses      Flowsheet Row Most Recent Value   Refill Screening Questions    Changes to allergies? No   Changes to medications? No   New conditions since last clinic visit? No   Unplanned office visit, urgent care, ED, or hospital admission in the last 4 weeks? No   How does patient/caregiver feel medication is working? Good   Financial problems or insurance changes? No   How many doses of your specialty medications were missed in the last 4 weeks? 0   Would patient like to speak to a pharmacist? No   When does the patient need to receive the medication? 07/08/23   Refill Delivery Questions    How will the patient receive the medication? Pickup   When does the patient need to receive the medication? 07/08/23   Shipping Address Home   Address in [facility] confirmed and updated if neccessary? Yes   Expected Copay ($) 0   Is the patient able to afford the medication copay? Yes   Payment Method zero copay   Days supply of Refill 30   Supplies needed? No supplies needed   Refill activity completed? Yes   Refill activity plan Refill scheduled   Shipment/Pickup Date: 07/07/23            Current Outpatient Medications   Medication Sig    calcium-vitamin D (CALCIUM 600 + D,3,) 600 mg-10 mcg (400 unit) Tab Take 1 tablet by mouth 2 (two) times daily.    celecoxib (CELEBREX) 100 MG capsule Take 100 mg by mouth 2 (two) times daily.    folic acid (FOLVITE) 1 MG tablet Take 1,000 mcg by mouth.    hydrOXYchloroQUINE (PLAQUENIL) 200 mg tablet Take 1 tablet (200 mg total) by mouth once daily.    lisinopriL (PRINIVIL,ZESTRIL) 20 MG tablet     lisinopriL 10 MG tablet Take 10 mg by mouth.    metFORMIN (GLUCOPHAGE-XR) 500 MG ER 24hr tablet     pantoprazole (PROTONIX) 40  MG tablet     predniSONE (DELTASONE) 5 MG tablet May take 5-15 mg of prednisone daily PRN for RA related pain.  Do not mix with NSAIDs (like Ibuprofen)    topiramate (TOPAMAX) 50 MG tablet Take 50 mg by mouth once daily.    upadacitinib (RINVOQ) 15 mg 24 hr tablet Take 1 tablet (15 mg total) by mouth once daily.   Last reviewed on 5/17/2023  9:39 AM by Mehrdad Garber, PharmD    Review of patient's allergies indicates:   Allergen Reactions    Penicillins Anaphylaxis and Swelling    Hydromorphone     Macrolide antibiotics Nausea And Vomiting     Patient states she can take Azithromycin     Last reviewed on  5/17/2023 9:39 AM by Mehrdad Garber      Tasks added this encounter   No tasks added.   Tasks due within next 3 months   7/8/2023 - Refill Coordination Outreach (1 time occurrence)     [facility]  [address] [place] [address]  Phone: [phone]  Fax: [phone]

## 2023-07-31 ENCOUNTER — SPECIALTY PHARMACY (OUTPATIENT)
Dept: PHARMACY | Facility: CLINIC | Age: 43
End: 2023-07-31
Payer: COMMERCIAL

## 2023-07-31 NOTE — TELEPHONE ENCOUNTER
Specialty Pharmacy - Refill Coordination    Specialty Medication Orders Linked to Encounter      Flowsheet Row Most Recent Value   Medication #1 upadacitinib (RINVOQ) 15 mg 24 hr tablet (Order#[id number], Rx#[id number])            Refill Questions - Documented Responses      Flowsheet Row Most Recent Value   Patient Availability and HIPAA Verification    Does patient want to proceed with activity? Yes   HIPAA/medical authority confirmed? Yes   Relationship to patient of person spoken to? Self   Refill Screening Questions    Changes to allergies? No   Changes to medications? No   New conditions since last clinic visit? No   Unplanned office visit, urgent care, ED, or hospital admission in the last 4 weeks? No   How does patient/caregiver feel medication is working? Good   Financial problems or insurance changes? No   How many doses of your specialty medications were missed in the last 4 weeks? 0   Would patient like to speak to a pharmacist? No   When does the patient need to receive the medication? 08/06/23   Refill Delivery Questions    How will the patient receive the medication? MEDRx   When does the patient need to receive the medication? 08/06/23   Shipping Address Prescription   Address in [facility] confirmed and updated if neccessary? Yes   Expected Copay ($) 0   Is the patient able to afford the medication copay? Yes   Payment Method zero copay   Days supply of Refill 30   Supplies needed? No supplies needed   Refill activity completed? Yes   Refill activity plan Refill scheduled   Shipment/Pickup Date: 08/03/23            Current Outpatient Medications   Medication Sig    calcium-vitamin D (CALCIUM 600 + D,3,) 600 mg-10 mcg (400 unit) Tab Take 1 tablet by mouth 2 (two) times daily.    celecoxib (CELEBREX) 100 MG capsule Take 100 mg by mouth 2 (two) times daily.    folic acid (FOLVITE) 1 MG tablet Take 1,000 mcg by mouth.    hydrOXYchloroQUINE (PLAQUENIL) 200 mg tablet Take 1 tablet (200 mg total) by  mouth once daily.    lisinopriL (PRINIVIL,ZESTRIL) 20 MG tablet     lisinopriL 10 MG tablet Take 10 mg by mouth.    metFORMIN (GLUCOPHAGE-XR) 500 MG ER 24hr tablet     pantoprazole (PROTONIX) 40 MG tablet     predniSONE (DELTASONE) 5 MG tablet May take 5-15 mg of prednisone daily PRN for RA related pain.  Do not mix with NSAIDs (like Ibuprofen)    topiramate (TOPAMAX) 50 MG tablet Take 50 mg by mouth once daily.    upadacitinib (RINVOQ) 15 mg 24 hr tablet Take 1 tablet (15 mg total) by mouth once daily.   Last reviewed on 5/17/2023  9:39 AM by Mehrdad Garber, Elizabeth    Review of patient's allergies indicates:   Allergen Reactions    Penicillins Anaphylaxis and Swelling    Hydromorphone     Macrolide antibiotics Nausea And Vomiting     Patient states she can take Azithromycin     Last reviewed on  5/17/2023 9:39 AM by Mehrdad Garber      Tasks added this encounter   No tasks added.   Tasks due within next 3 months   No tasks due.     [facility]  [address] [place], [place] [address]  [facility] [address]  Phone: [phone]  Fax: [phone]

## 2023-08-16 ENCOUNTER — LAB VISIT (OUTPATIENT)
Dept: LAB | Facility: HOSPITAL | Age: 43
End: 2023-08-16
Attending: INTERNAL MEDICINE
Payer: COMMERCIAL

## 2023-08-16 DIAGNOSIS — M13.80 SERONEGATIVE ARTHRITIS: ICD-10-CM

## 2023-08-16 DIAGNOSIS — M47.819 SPONDYLOARTHRITIS: ICD-10-CM

## 2023-08-16 DIAGNOSIS — Z15.89 HLA B27 (HLA B27 POSITIVE): ICD-10-CM

## 2023-08-16 DIAGNOSIS — Z79.899 HIGH RISK MEDICATION USE: ICD-10-CM

## 2023-08-16 LAB
ALBUMIN SERPL BCP-MCNC: 4 G/DL (ref 3.5–5.2)
ALP SERPL-CCNC: 32 U/L (ref 55–135)
ALT SERPL W/O P-5'-P-CCNC: 15 U/L (ref 10–44)
ANION GAP SERPL CALC-SCNC: 10 MMOL/L (ref 8–16)
AST SERPL-CCNC: 21 U/L (ref 10–40)
BASOPHILS # BLD AUTO: 0.04 K/UL (ref 0–0.2)
BASOPHILS NFR BLD: 0.5 % (ref 0–1.9)
BILIRUB SERPL-MCNC: 0.9 MG/DL (ref 0.1–1)
BUN SERPL-MCNC: 9 MG/DL (ref 6–20)
CALCIUM SERPL-MCNC: 8.7 MG/DL (ref 8.7–10.5)
CHLORIDE SERPL-SCNC: 104 MMOL/L (ref 95–110)
CO2 SERPL-SCNC: 23 MMOL/L (ref 23–29)
CREAT SERPL-MCNC: 0.8 MG/DL (ref 0.5–1.4)
CRP SERPL-MCNC: <0.1 MG/L (ref 0–8.2)
DIFFERENTIAL METHOD: ABNORMAL
EOSINOPHIL # BLD AUTO: 0 K/UL (ref 0–0.5)
EOSINOPHIL NFR BLD: 0.5 % (ref 0–8)
ERYTHROCYTE [DISTWIDTH] IN BLOOD BY AUTOMATED COUNT: 12.5 % (ref 11.5–14.5)
ERYTHROCYTE [SEDIMENTATION RATE] IN BLOOD BY WESTERGREN METHOD: 8 MM/HR (ref 0–20)
EST. GFR  (NO RACE VARIABLE): >60 ML/MIN/1.73 M^2
GLUCOSE SERPL-MCNC: 117 MG/DL (ref 70–110)
HCT VFR BLD AUTO: 36.9 % (ref 37–48.5)
HGB BLD-MCNC: 12.4 G/DL (ref 12–16)
IMM GRANULOCYTES # BLD AUTO: 0.05 K/UL (ref 0–0.04)
IMM GRANULOCYTES NFR BLD AUTO: 0.6 % (ref 0–0.5)
LYMPHOCYTES # BLD AUTO: 3.5 K/UL (ref 1–4.8)
LYMPHOCYTES NFR BLD: 40.4 % (ref 18–48)
MCH RBC QN AUTO: 33.2 PG (ref 27–31)
MCHC RBC AUTO-ENTMCNC: 33.6 G/DL (ref 32–36)
MCV RBC AUTO: 99 FL (ref 82–98)
MONOCYTES # BLD AUTO: 0.5 K/UL (ref 0.3–1)
MONOCYTES NFR BLD: 5.4 % (ref 4–15)
NEUTROPHILS # BLD AUTO: 4.6 K/UL (ref 1.8–7.7)
NEUTROPHILS NFR BLD: 52.6 % (ref 38–73)
NRBC BLD-RTO: 0 /100 WBC
PLATELET # BLD AUTO: 355 K/UL (ref 150–450)
PMV BLD AUTO: 9.8 FL (ref 9.2–12.9)
POTASSIUM SERPL-SCNC: 4.1 MMOL/L (ref 3.5–5.1)
PROT SERPL-MCNC: 6.9 G/DL (ref 6–8.4)
RBC # BLD AUTO: 3.73 M/UL (ref 4–5.4)
SODIUM SERPL-SCNC: 137 MMOL/L (ref 136–145)
WBC # BLD AUTO: 8.64 K/UL (ref 3.9–12.7)

## 2023-08-16 PROCEDURE — 85025 COMPLETE CBC W/AUTO DIFF WBC: CPT | Performed by: INTERNAL MEDICINE

## 2023-08-16 PROCEDURE — 80053 COMPREHEN METABOLIC PANEL: CPT | Performed by: INTERNAL MEDICINE

## 2023-08-16 PROCEDURE — 36415 COLL VENOUS BLD VENIPUNCTURE: CPT | Mod: PO | Performed by: INTERNAL MEDICINE

## 2023-08-16 PROCEDURE — 85651 RBC SED RATE NONAUTOMATED: CPT | Performed by: INTERNAL MEDICINE

## 2023-08-16 PROCEDURE — 86140 C-REACTIVE PROTEIN: CPT | Performed by: INTERNAL MEDICINE

## 2023-10-11 ENCOUNTER — LAB VISIT (OUTPATIENT)
Dept: LAB | Facility: HOSPITAL | Age: 43
End: 2023-10-11
Attending: NURSE PRACTITIONER
Payer: COMMERCIAL

## 2023-10-11 DIAGNOSIS — Z13.1 SCREENING FOR DIABETES MELLITUS: ICD-10-CM

## 2023-10-11 DIAGNOSIS — Z13.29 SCREENING FOR THYROID DISORDER: Primary | ICD-10-CM

## 2023-10-11 DIAGNOSIS — Z13.6 SCREENING FOR ISCHEMIC HEART DISEASE: ICD-10-CM

## 2023-10-11 LAB
CHOLEST SERPL-MCNC: 199 MG/DL (ref 120–199)
CHOLEST/HDLC SERPL: 1.6 {RATIO} (ref 2–5)
ESTIMATED AVG GLUCOSE: 88 MG/DL (ref 68–131)
HBA1C MFR BLD: 4.7 % (ref 4–5.6)
HDLC SERPL-MCNC: 124 MG/DL (ref 40–75)
HDLC SERPL: 62.3 % (ref 20–50)
LDLC SERPL CALC-MCNC: 59.2 MG/DL (ref 63–159)
NONHDLC SERPL-MCNC: 75 MG/DL
TRIGL SERPL-MCNC: 79 MG/DL (ref 30–150)
TSH SERPL DL<=0.005 MIU/L-ACNC: 2.07 UIU/ML (ref 0.4–4)

## 2023-10-11 PROCEDURE — 36415 COLL VENOUS BLD VENIPUNCTURE: CPT | Mod: PO | Performed by: NURSE PRACTITIONER

## 2023-10-11 PROCEDURE — 84443 ASSAY THYROID STIM HORMONE: CPT | Performed by: NURSE PRACTITIONER

## 2023-10-11 PROCEDURE — 80061 LIPID PANEL: CPT | Performed by: NURSE PRACTITIONER

## 2023-10-11 PROCEDURE — 83036 HEMOGLOBIN GLYCOSYLATED A1C: CPT | Performed by: NURSE PRACTITIONER

## 2023-11-24 ENCOUNTER — PATIENT MESSAGE (OUTPATIENT)
Dept: ADMINISTRATIVE | Facility: OTHER | Age: 43
End: 2023-11-24
Payer: COMMERCIAL

## 2023-12-22 ENCOUNTER — PATIENT MESSAGE (OUTPATIENT)
Dept: ADMINISTRATIVE | Facility: OTHER | Age: 43
End: 2023-12-22
Payer: COMMERCIAL

## 2023-12-22 DIAGNOSIS — M13.80 SERONEGATIVE ARTHRITIS: ICD-10-CM

## 2023-12-22 DIAGNOSIS — M19.90 INFLAMMATORY ARTHRITIS: ICD-10-CM

## 2023-12-22 RX ORDER — PREDNISONE 5 MG/1
TABLET ORAL
Qty: 90 TABLET | Refills: 1 | Status: SHIPPED | OUTPATIENT
Start: 2023-12-22

## 2023-12-22 RX ORDER — HYDROXYCHLOROQUINE SULFATE 200 MG/1
200 TABLET, FILM COATED ORAL 2 TIMES DAILY
Qty: 180 TABLET | Refills: 0 | Status: SHIPPED | OUTPATIENT
Start: 2023-12-22 | End: 2024-03-04

## 2023-12-22 RX ORDER — MULTIVITAMIN
1 TABLET ORAL 2 TIMES DAILY
Qty: 60 TABLET | Refills: 3 | Status: SHIPPED | OUTPATIENT
Start: 2023-12-22

## 2024-03-04 RX ORDER — HYDROXYCHLOROQUINE SULFATE 200 MG/1
200 TABLET, FILM COATED ORAL 2 TIMES DAILY
Qty: 180 TABLET | Refills: 3 | Status: SHIPPED | OUTPATIENT
Start: 2024-03-04

## 2025-04-15 NOTE — TELEPHONE ENCOUNTER
Name of Medication(s) Requested:  Requested Prescriptions     Pending Prescriptions Disp Refills    Melatonin 5 MG CAPS 90 capsule 1     Sig: Take 1 tablet by mouth nightly       Medication is on current medication list Yes    Dosage and directions were verified? Yes    Quantity verified: 90 day supply     Pharmacy Verified?  Yes    Last Appointment:  9/11/2024    Future appts:  No future appointments.     (If no appt send self scheduling link. .REFILLAPPT)  Scheduling request sent?     [] Yes  [] No    Does patient need updated?  [] Yes  [x] No   Script sent to pharmacy. Thanks.